# Patient Record
Sex: MALE | Race: BLACK OR AFRICAN AMERICAN | NOT HISPANIC OR LATINO | Employment: FULL TIME | URBAN - METROPOLITAN AREA
[De-identification: names, ages, dates, MRNs, and addresses within clinical notes are randomized per-mention and may not be internally consistent; named-entity substitution may affect disease eponyms.]

---

## 2018-02-01 ENCOUNTER — HOSPITAL ENCOUNTER (EMERGENCY)
Facility: HOSPITAL | Age: 43
Discharge: HOME | End: 2018-02-01
Attending: EMERGENCY MEDICINE | Admitting: EMERGENCY MEDICINE
Payer: COMMERCIAL

## 2018-02-01 VITALS
OXYGEN SATURATION: 96 % | HEART RATE: 84 BPM | TEMPERATURE: 98.1 F | DIASTOLIC BLOOD PRESSURE: 102 MMHG | HEIGHT: 69 IN | SYSTOLIC BLOOD PRESSURE: 174 MMHG | RESPIRATION RATE: 17 BRPM

## 2018-02-01 DIAGNOSIS — K42.9 UMBILICAL HERNIA: Primary | ICD-10-CM

## 2018-02-01 PROCEDURE — 99283 EMERGENCY DEPT VISIT LOW MDM: CPT

## 2018-02-02 NOTE — DISCHARGE INSTRUCTIONS
Follow up with General surgery  Hold that area and push the hernia back in if it comes out  Return increasing pain redness fever vomiting or any problems    Umbilical Hernia   WHAT YOU NEED TO KNOW:   An umbilical hernia is a bulge through the abdominal muscles in the area of the navel (belly button)  The hernia may contain tissue from the abdomen, part of an organ (such as the intestine), or fluid  Umbilical hernias usually happen because of a hole or a weak area in the muscles of the abdominal wall  DISCHARGE INSTRUCTIONS:   Medicines:  · Ibuprofen or acetaminophen:  These medicines decrease pain  They are available without a doctor's order  Ask your healthcare provider which medicine is right for you  Ask how much to take and how often to take it  Follow directions  These medicines can cause stomach bleeding if not taken correctly  Ibuprofen can cause kidney damage  Do not take ibuprofen if you have kidney disease, an ulcer, or allergies to aspirin  Acetaminophen can cause liver damage  Do not drink alcohol if you take acetaminophen  · Take your medicine as directed  Contact your healthcare provider if you think your medicine is not helping or if you have side effects  Tell him of her if you are allergic to any medicine  Keep a list of the medicines, vitamins, and herbs you take  Include the amounts, and when and why you take them  Bring the list or the pill bottles to follow-up visits  Carry your medicine list with you in case of an emergency  Follow up with your healthcare provider as directed:  Write down your questions so you remember to ask them during your visits  Self care:   · Activity:  Avoid lifting, bending, and straining  Try not to stand for long periods of time  If you have to cough, try to cough gently  Support the hernia by holding your hand or a pillow over it when coughing  Ask your healthcare provider if it is okay for you to have sexual intercourse       · Prevent constipation: Straining to have a bowel movement may make your hernia worse  Eat foods that are high in fiber and drink at least 8 glasses of water a day  A high-fiber diet includes whole grains, bran, cereals, and uncooked fruit and vegetables  Walking or other exercise can also help  Your healthcare provider may give you fiber medicine or a stool softener to help make your bowel movements softer and more regular  Do not use an enema or a laxative unless your healthcare provider says it is okay  · What to wear:  Do not wear anything tight over your hernia  Ask your healthcare provider if you should wear support belt or girdle to keep the hernia in place  · Ask your healthcare provider how to reduce your hernia:  Sometimes your hernia will slip back into your abdomen if you lie flat for a while  Ask your healthcare provider if you should try to gently push your hernia back into place if lying flat does not work  If your hernia does not slide back into your abdomen easily, stop pushing on it and call your healthcare provider  Do not try to push the hernia back into place if it is painful or tender  Contact your healthcare provider if:   · You have nausea or vomiting  · You cannot gently push your hernia back into your abdomen  (Do this only if your healthcare provider has shown you how to do it )     · You are constipated or have blood in your bowel movements  · Your hernia is getting bigger  · You have questions or concerns about your condition or care  Return to the emergency department if:   · You have a fever  · Your hernia is stuck outside your abdomen and is painful, swollen, or feels hard  · You completely stop having bowel movements and stop passing gas  · Your abdominal pain is bad or getting worse  © 2017 [employer]  Information is for End User's use only and may not be sold, redistributed or otherwise used for commercial purposes   All illustrations and images included in CareNotes® are the copyrighted property of [employer]  or Nasim Subramanian  The above information is an  only  It is not intended as medical advice for individual conditions or treatments  Talk to your doctor, nurse or pharmacist before following any medical regimen to see if it is safe and effective for you

## 2018-02-02 NOTE — ED PROVIDER NOTES
History  Chief Complaint   Patient presents with    Abdominal Pain     Pt c/o abdominal pain around umbilicus starting 3 weeks ago  Denies n/v/d  States "there's a lump where it hurts"     HPI patient is a 79-year-old male, he reports a intermittent bulge in his abdomen he has noticed over the last 3 weeks  Patient reports the lump gets very painful there specially with lifting at work  He reports that he can't push it back at times but sometimes it sticks out  Patient reports today he felt a significant lump there and some difficulty pushing it back so he came to the hospital   He denies any vomiting  He reports the pain has subsided and he has no pain currently now  Patient reports he only feels lump that when he strains or tries to push it out  He denies any fever or chills  He denies any previous abdominal surgery  Denies any pain in his scrotum or rectum  Past medical history of asthma  Family history noncontributory  Social history nonsmoker, employed    None       Past Medical History:   Diagnosis Date    Asthma        History reviewed  No pertinent surgical history  History reviewed  No pertinent family history  I have reviewed and agree with the history as documented  Social History   Substance Use Topics    Smoking status: Never Smoker    Smokeless tobacco: Never Used    Alcohol use Yes      Comment: occasionally        Review of Systems   Constitutional: Negative for fever  HENT: Negative for congestion  Eyes: Negative for pain and redness  Respiratory: Negative for cough and shortness of breath  Cardiovascular: Negative for chest pain  Gastrointestinal: Positive for abdominal pain  Negative for diarrhea and vomiting         Physical Exam  ED Triage Vitals [02/01/18 2120]   Temperature Pulse Respirations Blood Pressure SpO2   98 1 °F (36 7 °C) 84 17 (!) 174/102 96 %      Temp Source Heart Rate Source Patient Position - Orthostatic VS BP Location FiO2 (%)   Oral Monitor Sitting Left arm --      Pain Score       7           Orthostatic Vital Signs  Vitals:    02/01/18 2120   BP: (!) 174/102   Pulse: 84   Patient Position - Orthostatic VS: Sitting       Physical Exam   Constitutional: He is oriented to person, place, and time  He appears well-developed and well-nourished  HENT:   Head: Normocephalic  Right Ear: External ear normal    Left Ear: External ear normal    Nose: Nose normal    Mouth/Throat: Oropharynx is clear and moist    Eyes: EOM and lids are normal  Pupils are equal, round, and reactive to light  Neck: Normal range of motion  Neck supple  Cardiovascular: Normal rate and regular rhythm  Pulmonary/Chest: Effort normal  No respiratory distress  Abdominal: Soft  Bowel sounds are normal  He exhibits no distension  There is an umbilical hernia palpable on with patient standing and when he bears down, when the patient is flat easily reduces, normal testicles, normal scrotum, no inguinal hernias  Musculoskeletal: Normal range of motion  He exhibits no deformity  Neurological: He is alert and oriented to person, place, and time  Skin: Skin is warm and dry  Psychiatric: He has a normal mood and affect  Nursing note and vitals reviewed  ED Medications  Medications - No data to display    Diagnostic Studies  Results Reviewed     None                 No orders to display              Procedures  Procedures       Phone Contacts  ED Phone Contact    ED Course  ED Course                                MDM medical decision making 78-year-old male presents with history of a lump around his umbilicus, on physical exam has a reducible umbilical hernia, we discussed outpatient follow-up with surgery for repair, we discussed indications to return such as when the hernia gets stuck out, watch for redness swelling and persistent pain and a hernia he cannot reduce  No sign of incarceration at this time    CritCare Time    Disposition  Final diagnoses:   Umbilical hernia     Time reflects when diagnosis was documented in both MDM as applicable and the Disposition within this note     Time User Action Codes Description Comment    2/1/2018 10:16 PM Reta Neri [B43 8] Umbilical hernia       ED Disposition     ED Disposition Condition Comment    Discharge  Alesha Rosenberg discharge to home/self care  Condition at discharge: Good        Follow-up Information     Follow up With [facility], [address] [place] [address]  [phone]          There are no discharge medications for this patient  No discharge procedures on file      ED Provider  Electronically Signed by           Arnaldo Villatoro MD  02/02/18 5014

## 2018-02-06 ENCOUNTER — OFFICE VISIT (OUTPATIENT)
Dept: SURGERY | Facility: CLINIC | Age: 43
End: 2018-02-06
Payer: COMMERCIAL

## 2018-02-06 VITALS
SYSTOLIC BLOOD PRESSURE: 138 MMHG | TEMPERATURE: 97.9 F | BODY MASS INDEX: 30.07 KG/M2 | RESPIRATION RATE: 15 BRPM | HEART RATE: 74 BPM | WEIGHT: 210.04 LBS | HEIGHT: 70 IN | DIASTOLIC BLOOD PRESSURE: 86 MMHG

## 2018-02-06 DIAGNOSIS — K42.0 INCARCERATED UMBILICAL HERNIA: Primary | ICD-10-CM

## 2018-02-06 PROCEDURE — 99243 OFF/OP CNSLTJ NEW/EST LOW 30: CPT | Performed by: SURGERY

## 2018-02-06 RX ORDER — SODIUM CHLORIDE, SODIUM LACTATE, POTASSIUM CHLORIDE, CALCIUM CHLORIDE 600; 310; 30; 20 MG/100ML; MG/100ML; MG/100ML; MG/100ML
125 INJECTION, SOLUTION INTRAVENOUS CONTINUOUS
Status: CANCELLED | OUTPATIENT
Start: 2018-02-06

## 2018-02-06 RX ORDER — ALBUTEROL SULFATE 90 UG/1
2 AEROSOL, METERED RESPIRATORY (INHALATION)
COMMUNITY
Start: 2015-05-15

## 2018-02-06 NOTE — PROGRESS NOTES
Consult- General Surgery   Mike Wes Ulrich 36 y o  male MRN: [id number]  Unit/Bed#:  Encounter: [id number]    Assessment/Plan     Assessment:  Incarcerated umbilical hernia  Plan:  I advised the patient to undergo laparoscopic umbilical hernia repair with mesh  I discussed the operative procedure, risks, benefits and alternatives with the patient, he understood and agreed to proceed  History of Present Illness     HPI:  Shante Mcclellan is a 36 y o  male who presents with umbilical pain last week, he went to the emergency room and evaluated  The patient was referred to us for surgical evaluation of umbilical hernia  The patient had pain after coughing from a cold, noticed a bulge there was significant in size, painful to palpation without nausea, vomiting, diarrhea, constipation or any other constitutional symptoms  Review of Systems   Constitutional: Negative for chills and fever  HENT: Negative for nosebleeds and sore throat  Eyes: Negative for pain and discharge  Respiratory: Negative for cough and shortness of breath  Cardiovascular: Negative for chest pain and palpitations  Gastrointestinal:        As per history of present illness  Endocrine: Negative for cold intolerance and heat intolerance  Genitourinary: Negative for dysuria and hematuria  Musculoskeletal: Negative for arthralgias and joint swelling  Neurological: Negative for seizures and headaches  Hematological: Negative for adenopathy  Does not bruise/bleed easily  Psychiatric/Behavioral: Negative for confusion  The patient is not nervous/anxious  Historical Information   Past Medical History:   Diagnosis Date    Asthma      No past surgical history on file  Social History   History   Alcohol Use    Yes     Comment: occasionally     History   Drug Use No     History   Smoking Status    Never Smoker   Smokeless Tobacco    Never Used     Family History: No family history on file      Meds/Allergies   PTA meds:   Cannot display prior to admission medications because the patient has not been admitted in this contact  No Known Allergies    Objective   First Vitals:   @VSFIRST2(5,8,6,7,9,11,14,10:FIRST)@    Current Vitals:   Blood Pressure: 138/86 (02/06/18 1029)  Pulse: 74 (02/06/18 1029)  Temperature: 97 9 °F (36 6 °C) (02/06/18 1029)  Temp Source: Oral (02/06/18 1029)  Respirations: 15 (02/06/18 1029)  Height: 5' 10" (177 8 cm) (02/06/18 1029)  Weight - Scale: 95 3 kg (210 lb 0 6 oz) (02/06/18 1029)    [unfilled]    Invasive Devices          No matching active lines, drains, or airways          Physical Exam   Constitutional: He is oriented to person, place, and time  He appears well-developed and well-nourished  No distress  Morbidly obese   HENT:   Head: Normocephalic  Mouth/Throat: No oropharyngeal exudate  Eyes: Pupils are equal, round, and reactive to light  No scleral icterus  Neck: Normal range of motion  Neck supple  No thyromegaly present  Cardiovascular: Normal rate and regular rhythm  No murmur heard  Pulmonary/Chest: Effort normal and breath sounds normal  No respiratory distress  He has no wheezes  Abdominal:   Abdomen is obese, soft, nondistended and nontender  There is a small incarcerated umbilical hernia with a bulge protruding to the right of the umbilicus  There is no obvious visceromegaly or mass palpable  Musculoskeletal: Normal range of motion  He exhibits no edema  Lymphadenopathy:     He has no cervical adenopathy  Neurological: He is alert and oriented to person, place, and time  No cranial nerve deficit  Skin: Skin is warm and dry  No rash noted  No erythema  Psychiatric: He has a normal mood and affect   His behavior is normal

## 2018-02-14 ENCOUNTER — TELEPHONE (OUTPATIENT)
Dept: SURGERY | Facility: CLINIC | Age: 43
End: 2018-02-14

## 2018-02-14 NOTE — TELEPHONE ENCOUNTER
called pt asking for him to please contact the office as soon as he can, regarding scheduling his surgery

## 2018-02-19 ENCOUNTER — TELEPHONE (OUTPATIENT)
Dept: SURGERY | Facility: CLINIC | Age: 43
End: 2018-02-19

## 2018-05-29 ENCOUNTER — OFFICE VISIT (OUTPATIENT)
Dept: SURGERY | Facility: CLINIC | Age: 43
End: 2018-05-29
Payer: COMMERCIAL

## 2018-05-29 VITALS
WEIGHT: 264.5 LBS | DIASTOLIC BLOOD PRESSURE: 74 MMHG | HEIGHT: 70 IN | SYSTOLIC BLOOD PRESSURE: 132 MMHG | RESPIRATION RATE: 16 BRPM | TEMPERATURE: 98.3 F | HEART RATE: 72 BPM | BODY MASS INDEX: 37.87 KG/M2

## 2018-05-29 DIAGNOSIS — K42.0 INCARCERATED UMBILICAL HERNIA: Primary | ICD-10-CM

## 2018-05-29 PROCEDURE — 99213 OFFICE O/P EST LOW 20 MIN: CPT | Performed by: SURGERY

## 2018-05-29 RX ORDER — ENOXAPARIN SODIUM 300 MG/3ML
40 INJECTION INTRAVENOUS; SUBCUTANEOUS
Status: CANCELLED | OUTPATIENT
Start: 2018-05-29 | End: 2018-05-30

## 2018-05-29 RX ORDER — SODIUM CHLORIDE, SODIUM LACTATE, POTASSIUM CHLORIDE, CALCIUM CHLORIDE 600; 310; 30; 20 MG/100ML; MG/100ML; MG/100ML; MG/100ML
125 INJECTION, SOLUTION INTRAVENOUS
Status: CANCELLED | OUTPATIENT
Start: 2018-05-29 | End: 2018-05-30

## 2018-05-29 NOTE — PROGRESS NOTES
Follow Up - General Surgery   Mike Parker 39 y o  male MRN: [id number]  Unit/Bed#:  Encounter: [id number]    Assessment/Plan     Assessment:  Incarcerated umbilical hernia, symptomatic  Plan:  I advised the patient to undergo laparoscopic umbilical hernia repair with mesh in the near future  I discussed the operative procedure, risks, benefits and alternatives with the patient, he understood and agreed to proceed  History of Present Illness     HPI:  I had the pleasure of seeing Padilla Neighbours in the office today accompanied by his wife for follow-up  The patient is a pleasant 39 y o  male who presents with discomfort from the umbilicus  The patient was originally seen in my office in February of this year because of painful bulge from the umbilicus  The patient decided to postpone surgery  He is here in the office to schedule surgery  He complains of pain and discomfort and that the bulge has increased in size  Review of Systems: The rest of the review of system total of 10 were negative except for the HPI  Historical Information   Past Medical History:   Diagnosis Date    Asthma      No past surgical history on file    Social History   History   Alcohol Use    Yes     Comment: occasionally     History   Drug Use No     History   Smoking Status    Never Smoker   Smokeless Tobacco    Never Used     Family History: non-contributory    Meds/Allergies   all medications and allergies reviewed     Current Outpatient Prescriptions:     albuterol (PROVENTIL HFA,VENTOLIN HFA) 90 mcg/act inhaler, Inhale 2 puffs, Disp: , Rfl:   No Known Allergies    Objective     Current Vitals:   Blood Pressure: 132/74 (05/29/18 0939)  Pulse: 72 (05/29/18 0939)  Temperature: 98 3 °F (36 8 °C) (05/29/18 0939)  Temp Source: Oral (05/29/18 0939)  Respirations: 16 (05/29/18 0939)  Height: 5' 10" (177 8 cm) (05/29/18 0939)  Weight - Scale: 120 kg (264 lb 8 oz) (05/29/18 0939)      Invasive Devices          No matching active lines, drains, or airways          Physical Exam   Constitutional: He is oriented to person, place, and time  He appears well-developed and well-nourished  No distress  Morbidly obese  HENT:   Head: Normocephalic  Mouth/Throat: No oropharyngeal exudate  Eyes: Pupils are equal, round, and reactive to light  No scleral icterus  Neck: Normal range of motion  Neck supple  Cardiovascular: Normal rate and regular rhythm  No murmur heard  Pulmonary/Chest: Effort normal and breath sounds normal  No respiratory distress  Abdominal:   The abdomen is obese, soft, nondistended and nontender  There is an obvious umbilical hernia, unable to reduce it, somewhat tender to palpation  There is no obvious visceromegaly or mass palpable  Musculoskeletal: He exhibits no edema or tenderness  Lymphadenopathy:     He has no cervical adenopathy  Neurological: He is alert and oriented to person, place, and time  No cranial nerve deficit  Skin: No rash noted  No erythema  Psychiatric: He has a normal mood and affect   His behavior is normal

## 2018-05-30 PROBLEM — K42.0 INCARCERATED UMBILICAL HERNIA: Status: ACTIVE | Noted: 2018-05-30

## 2018-07-18 ENCOUNTER — APPOINTMENT (OUTPATIENT)
Dept: PREADMISSION TESTING | Facility: HOSPITAL | Age: 43
End: 2018-07-18
Payer: COMMERCIAL

## 2018-07-18 ENCOUNTER — APPOINTMENT (OUTPATIENT)
Dept: LAB | Facility: HOSPITAL | Age: 43
End: 2018-07-18
Attending: SURGERY
Payer: COMMERCIAL

## 2018-07-18 DIAGNOSIS — K42.0 INCARCERATED UMBILICAL HERNIA: ICD-10-CM

## 2018-07-18 LAB
ALBUMIN SERPL BCP-MCNC: 3.3 G/DL (ref 3.5–5)
ALP SERPL-CCNC: 93 U/L (ref 46–116)
ALT SERPL W P-5'-P-CCNC: 31 U/L (ref 12–78)
ANION GAP SERPL CALCULATED.3IONS-SCNC: 6 MMOL/L (ref 4–13)
AST SERPL W P-5'-P-CCNC: 20 U/L (ref 5–45)
BILIRUB SERPL-MCNC: 0.2 MG/DL (ref 0.2–1)
BUN SERPL-MCNC: 14 MG/DL (ref 5–25)
CALCIUM SERPL-MCNC: 8.9 MG/DL (ref 8.3–10.1)
CHLORIDE SERPL-SCNC: 106 MMOL/L (ref 100–108)
CO2 SERPL-SCNC: 28 MMOL/L (ref 21–32)
CREAT SERPL-MCNC: 1.01 MG/DL (ref 0.6–1.3)
ERYTHROCYTE [DISTWIDTH] IN BLOOD BY AUTOMATED COUNT: 13.7 % (ref 11.6–15.1)
GFR SERPL CREATININE-BSD FRML MDRD: 104 ML/MIN/1.73SQ M
GLUCOSE P FAST SERPL-MCNC: 100 MG/DL (ref 65–99)
HCT VFR BLD AUTO: 41.9 % (ref 36.5–49.3)
HGB BLD-MCNC: 13.7 G/DL (ref 12–17)
MCH RBC QN AUTO: 27.2 PG (ref 26.8–34.3)
MCHC RBC AUTO-ENTMCNC: 32.7 G/DL (ref 31.4–37.4)
MCV RBC AUTO: 83 FL (ref 82–98)
PLATELET # BLD AUTO: 208 THOUSANDS/UL (ref 149–390)
PMV BLD AUTO: 10.2 FL (ref 8.9–12.7)
POTASSIUM SERPL-SCNC: 3.6 MMOL/L (ref 3.5–5.3)
PROT SERPL-MCNC: 7.3 G/DL (ref 6.4–8.2)
RBC # BLD AUTO: 5.03 MILLION/UL (ref 3.88–5.62)
SODIUM SERPL-SCNC: 140 MMOL/L (ref 136–145)
WBC # BLD AUTO: 7.47 THOUSAND/UL (ref 4.31–10.16)

## 2018-07-18 PROCEDURE — 36415 COLL VENOUS BLD VENIPUNCTURE: CPT

## 2018-07-18 PROCEDURE — 80053 COMPREHEN METABOLIC PANEL: CPT

## 2018-07-18 PROCEDURE — 85027 COMPLETE CBC AUTOMATED: CPT

## 2018-07-20 NOTE — PRE-PROCEDURE INSTRUCTIONS
Pre-Surgery Instructions:   Medication Instructions    albuterol (PROVENTIL HFA,VENTOLIN HFA) 90 mcg/act inhaler Patient was instructed by Physician and understands

## 2018-07-26 ENCOUNTER — ANESTHESIA EVENT (OUTPATIENT)
Dept: PERIOP | Facility: HOSPITAL | Age: 43
End: 2018-07-26
Payer: COMMERCIAL

## 2018-07-26 NOTE — ANESTHESIA PREPROCEDURE EVALUATION
Review of Systems/Medical History  Patient summary reviewed  Chart reviewed  No history of anesthetic complications     Cardiovascular  Negative cardio ROS    Pulmonary  Not a smoker , Asthma , PRN med  controlled Last rescue: < 1 week ago Asthma type of rescue: PRN inhaler, No shortness of breath, No recent URI , No ventilator dependent respiratory failure,   Comment: STOP BANG >3     GI/Hepatic  Negative GI/hepatic ROS          Negative  ROS        Endo/Other  No diabetes , No history of thyroid disease ,   Obesity  morbid obesity   GYN  Negative gynecology ROS          Hematology  Negative hematology ROS      Musculoskeletal  Negative musculoskeletal ROS        Neurology  Negative neurology ROS      Psychology   Negative psychology ROS              Physical Exam    Airway    Mallampati score: I  TM Distance: >3 FB  Neck ROM: full     Dental   No notable dental hx     Cardiovascular  Comment: Negative ROS, Rhythm: regular, Rate: normal,     Pulmonary  Breath sounds clear to auscultation,     Other Findings        Anesthesia Plan  ASA Score- 2     Anesthesia Type- general with ASA Monitors  Additional Monitors:   Airway Plan: ETT  Plan Factors-    Induction- intravenous  Postoperative Plan- Plan for postoperative opioid use  Planned trial extubation    Informed Consent- Anesthetic plan and risks discussed with patient  I personally reviewed this patient with the CRNA  Discussed and agreed on the Anesthesia Plan with the PRESLEY Jackson

## 2018-07-27 ENCOUNTER — HOSPITAL ENCOUNTER (OUTPATIENT)
Facility: HOSPITAL | Age: 43
Setting detail: OUTPATIENT SURGERY
Discharge: HOME | End: 2018-07-27
Attending: SURGERY | Admitting: SURGERY
Payer: COMMERCIAL

## 2018-07-27 ENCOUNTER — ANESTHESIA (OUTPATIENT)
Dept: PERIOP | Facility: HOSPITAL | Age: 43
End: 2018-07-27
Payer: COMMERCIAL

## 2018-07-27 VITALS
HEIGHT: 69 IN | BODY MASS INDEX: 38.96 KG/M2 | OXYGEN SATURATION: 98 % | SYSTOLIC BLOOD PRESSURE: 133 MMHG | TEMPERATURE: 97.6 F | HEART RATE: 66 BPM | RESPIRATION RATE: 11 BRPM | DIASTOLIC BLOOD PRESSURE: 65 MMHG | WEIGHT: 263.01 LBS

## 2018-07-27 DIAGNOSIS — K42.0 INCARCERATED UMBILICAL HERNIA: ICD-10-CM

## 2018-07-27 PROCEDURE — C1781 MESH (IMPLANTABLE): HCPCS | Performed by: SURGERY

## 2018-07-27 PROCEDURE — 49653 PR LAP, VENTRAL HERNIA REPAIR,INCARCERATED: CPT | Performed by: PHYSICIAN ASSISTANT

## 2018-07-27 PROCEDURE — 49653 PR LAP, VENTRAL HERNIA REPAIR,INCARCERATED: CPT | Performed by: SURGERY

## 2018-07-27 DEVICE — VENTRALIGHT ST MESH
Type: IMPLANTABLE DEVICE | Site: UMBILICAL | Status: FUNCTIONAL
Brand: VENTRALIGHT ST

## 2018-07-27 RX ORDER — BUPIVACAINE HYDROCHLORIDE 2.5 MG/ML
INJECTION, SOLUTION EPIDURAL; INFILTRATION; INTRACAUDAL AS NEEDED
Status: DISCONTINUED | OUTPATIENT
Start: 2018-07-27 | End: 2018-07-27 | Stop reason: HOSPADM

## 2018-07-27 RX ORDER — ONDANSETRON 2 MG/ML
4 INJECTION INTRAMUSCULAR; INTRAVENOUS ONCE AS NEEDED
Status: DISCONTINUED | OUTPATIENT
Start: 2018-07-27 | End: 2018-07-27 | Stop reason: HOSPADM

## 2018-07-27 RX ORDER — FENTANYL CITRATE 50 UG/ML
INJECTION, SOLUTION INTRAMUSCULAR; INTRAVENOUS AS NEEDED
Status: DISCONTINUED | OUTPATIENT
Start: 2018-07-27 | End: 2018-07-27 | Stop reason: SURG

## 2018-07-27 RX ORDER — MORPHINE SULFATE 2 MG/ML
2 INJECTION, SOLUTION INTRAMUSCULAR; INTRAVENOUS EVERY 2 HOUR PRN
Status: DISCONTINUED | OUTPATIENT
Start: 2018-07-27 | End: 2018-07-27 | Stop reason: HOSPADM

## 2018-07-27 RX ORDER — GLYCOPYRROLATE 0.2 MG/ML
INJECTION INTRAMUSCULAR; INTRAVENOUS AS NEEDED
Status: DISCONTINUED | OUTPATIENT
Start: 2018-07-27 | End: 2018-07-27 | Stop reason: SURG

## 2018-07-27 RX ORDER — MAGNESIUM HYDROXIDE 1200 MG/15ML
LIQUID ORAL AS NEEDED
Status: DISCONTINUED | OUTPATIENT
Start: 2018-07-27 | End: 2018-07-27 | Stop reason: HOSPADM

## 2018-07-27 RX ORDER — FENTANYL CITRATE/PF 50 MCG/ML
50 SYRINGE (ML) INJECTION
Status: DISCONTINUED | OUTPATIENT
Start: 2018-07-27 | End: 2018-07-27 | Stop reason: HOSPADM

## 2018-07-27 RX ORDER — MIDAZOLAM HYDROCHLORIDE 1 MG/ML
INJECTION INTRAMUSCULAR; INTRAVENOUS AS NEEDED
Status: DISCONTINUED | OUTPATIENT
Start: 2018-07-27 | End: 2018-07-27 | Stop reason: SURG

## 2018-07-27 RX ORDER — ONDANSETRON 2 MG/ML
4 INJECTION INTRAMUSCULAR; INTRAVENOUS EVERY 6 HOURS PRN
Status: DISCONTINUED | OUTPATIENT
Start: 2018-07-27 | End: 2018-07-27 | Stop reason: HOSPADM

## 2018-07-27 RX ORDER — ROCURONIUM BROMIDE 10 MG/ML
INJECTION, SOLUTION INTRAVENOUS AS NEEDED
Status: DISCONTINUED | OUTPATIENT
Start: 2018-07-27 | End: 2018-07-27 | Stop reason: SURG

## 2018-07-27 RX ORDER — PROPOFOL 10 MG/ML
INJECTION, EMULSION INTRAVENOUS AS NEEDED
Status: DISCONTINUED | OUTPATIENT
Start: 2018-07-27 | End: 2018-07-27 | Stop reason: SURG

## 2018-07-27 RX ORDER — SODIUM CHLORIDE, SODIUM LACTATE, POTASSIUM CHLORIDE, CALCIUM CHLORIDE 600; 310; 30; 20 MG/100ML; MG/100ML; MG/100ML; MG/100ML
125 INJECTION, SOLUTION INTRAVENOUS
Status: DISCONTINUED | OUTPATIENT
Start: 2018-07-27 | End: 2018-07-27 | Stop reason: HOSPADM

## 2018-07-27 RX ORDER — SODIUM CHLORIDE, SODIUM LACTATE, POTASSIUM CHLORIDE, CALCIUM CHLORIDE 600; 310; 30; 20 MG/100ML; MG/100ML; MG/100ML; MG/100ML
75 INJECTION, SOLUTION INTRAVENOUS CONTINUOUS
Status: DISCONTINUED | OUTPATIENT
Start: 2018-07-27 | End: 2018-07-27 | Stop reason: HOSPADM

## 2018-07-27 RX ORDER — ONDANSETRON 2 MG/ML
INJECTION INTRAMUSCULAR; INTRAVENOUS AS NEEDED
Status: DISCONTINUED | OUTPATIENT
Start: 2018-07-27 | End: 2018-07-27 | Stop reason: SURG

## 2018-07-27 RX ORDER — ACETAMINOPHEN AND CODEINE PHOSPHATE 300; 30 MG/1; MG/1
1 TABLET ORAL EVERY 6 HOURS PRN
Qty: 30 TABLET | Refills: 0 | Status: SHIPPED | OUTPATIENT
Start: 2018-07-27 | End: 2018-08-06

## 2018-07-27 RX ORDER — KETOROLAC TROMETHAMINE 30 MG/ML
INJECTION, SOLUTION INTRAMUSCULAR; INTRAVENOUS AS NEEDED
Status: DISCONTINUED | OUTPATIENT
Start: 2018-07-27 | End: 2018-07-27 | Stop reason: SURG

## 2018-07-27 RX ORDER — OXYCODONE HYDROCHLORIDE AND ACETAMINOPHEN 5; 325 MG/1; MG/1
1 TABLET ORAL EVERY 4 HOURS PRN
Status: DISCONTINUED | OUTPATIENT
Start: 2018-07-27 | End: 2018-07-27 | Stop reason: HOSPADM

## 2018-07-27 RX ADMIN — KETOROLAC TROMETHAMINE 30 MG: 30 INJECTION, SOLUTION INTRAMUSCULAR at 08:51

## 2018-07-27 RX ADMIN — PROPOFOL 200 MG: 10 INJECTION, EMULSION INTRAVENOUS at 07:52

## 2018-07-27 RX ADMIN — ENOXAPARIN SODIUM 40 MG: 40 INJECTION SUBCUTANEOUS at 07:15

## 2018-07-27 RX ADMIN — MIDAZOLAM HYDROCHLORIDE 2 MG: 1 INJECTION, SOLUTION INTRAMUSCULAR; INTRAVENOUS at 07:49

## 2018-07-27 RX ADMIN — HYDROMORPHONE HYDROCHLORIDE 0.5 MG: 1 INJECTION, SOLUTION INTRAMUSCULAR; INTRAVENOUS; SUBCUTANEOUS at 08:06

## 2018-07-27 RX ADMIN — OXYCODONE HYDROCHLORIDE AND ACETAMINOPHEN 1 TABLET: 5; 325 TABLET ORAL at 11:05

## 2018-07-27 RX ADMIN — ONDANSETRON 4 MG: 2 INJECTION INTRAMUSCULAR; INTRAVENOUS at 08:49

## 2018-07-27 RX ADMIN — FENTANYL CITRATE 100 MCG: 50 INJECTION, SOLUTION INTRAMUSCULAR; INTRAVENOUS at 07:52

## 2018-07-27 RX ADMIN — FENTANYL CITRATE 50 MCG: 50 INJECTION, SOLUTION INTRAMUSCULAR; INTRAVENOUS at 09:34

## 2018-07-27 RX ADMIN — LIDOCAINE HYDROCHLORIDE 100 MG: 20 INJECTION, SOLUTION INTRAVENOUS at 07:52

## 2018-07-27 RX ADMIN — NEOSTIGMINE METHYLSULFATE 4 MG: 1 INJECTION, SOLUTION INTRAMUSCULAR; INTRAVENOUS; SUBCUTANEOUS at 08:58

## 2018-07-27 RX ADMIN — ROCURONIUM BROMIDE 50 MG: 10 INJECTION INTRAVENOUS at 07:52

## 2018-07-27 RX ADMIN — FENTANYL CITRATE 50 MCG: 50 INJECTION, SOLUTION INTRAMUSCULAR; INTRAVENOUS at 09:45

## 2018-07-27 RX ADMIN — GLYCOPYRROLATE 0.6 MG: 0.2 INJECTION, SOLUTION INTRAMUSCULAR; INTRAVENOUS at 08:58

## 2018-07-27 RX ADMIN — SODIUM CHLORIDE, SODIUM LACTATE, POTASSIUM CHLORIDE, AND CALCIUM CHLORIDE 75 ML/HR: .6; .31; .03; .02 INJECTION, SOLUTION INTRAVENOUS at 07:15

## 2018-07-27 RX ADMIN — CEFAZOLIN SODIUM 2000 MG: 2 SOLUTION INTRAVENOUS at 07:57

## 2018-07-27 RX ADMIN — DEXAMETHASONE SODIUM PHOSPHATE 5 MG: 10 INJECTION INTRAMUSCULAR; INTRAVENOUS at 08:06

## 2018-07-27 NOTE — DISCHARGE INSTRUCTIONS
No diet restriction for this surgery  May shower every day  Remove dressings in 3 days  Remove strips in 7 days  Call office to make an appointment in 2 weeks [phone]  Call office with any issues regarding the surgery  No driving, heavy lifting or strenuous exercise for one week  Resume home medications  Apply ice to the incisions  May take Tylenol 3, regular Tylenol or ibuprofen for pain  Alternating narcotics with ibuprofen or Advil leave will have better pain control    May take Colace for constipation

## 2018-07-27 NOTE — ANESTHESIA POSTPROCEDURE EVALUATION
Post-Op Assessment Note      CV Status:  Stable    Mental Status:  Awake    Hydration Status:  Stable    PONV Controlled:  None    Airway Patency:  Patent    Post Op Vitals Reviewed: Yes          Staff: CRNA           BP   160/70   Temp   99 0   Pulse  84   Resp   18   SpO2   99% on 6LFM   Postop VS in PACU noted above, SV non-obstructed

## 2018-07-27 NOTE — OP NOTE
OPERATIVE REPORT  PATIENT NAME: Tere Dandy    :  1975  MRN: [id number]  Pt Location: [facility] OR ROOM 03    SURGERY DATE: 2018    Surgeon(s) and Role:     * Mellissa Jordan MD - Primary     * Cheyenne Waggoner PA-C - Assisting    Preop Diagnosis:  Incarcerated umbilical hernia [W14 2]    Post-Op Diagnosis Codes:     * Incarcerated umbilical hernia [Y60 5]    Procedure(s) (LRB):  LAPAROSCOPIC UMBILICAL HERNIA REPAIR WITH MESH (N/A)    Specimen(s):  * No specimens in log *    Estimated Blood Loss:   Minimal    Drains:       Anesthesia Type:   General    Operative Indications:  Incarcerated umbilical hernia [D36 9]      Operative Findings: There was a 2 5 cm defect at the umbilicus with incarceration of omentum  The rest of the abdominal cavity showed no evidence of inflammatory neoplastic process  Complications:   None    Procedure and Technique:  The patient was identified and then he was taken to the operating room and placed in the operating table in a supine position  After adequate anesthesia induction and satisfactory endotracheal intubation the abdomen was prepped and draped under sterile usual fashion with Chloraprep  Timeout was called the patient was identified as well as the surgical site  The abdominal wall was elevated with towel clips, the Veress needle was introduced through the umbilicus after the omentum was reduced  The abdomen was insufflated with C02  After obtaining adequate pneumoperitoneum 5 mm trocar was placed on the right upper quadrant and the scope was advanced  Exploration of the abdomen was performed with the above findings  11 mm trocar was placed on the right lower quadrant under direct vision  5 mm trocar was placed on the right side of abdomen under direct vision  An additional 5 mm trocar was placed on the left side of the abdomen under direct vision  At this point I proceeded to dissect the hernia sac with the dissected and cautery   11 cm circular ventral light mesh was placed and tacked to the anterior abdominal wall with 0 Nurolon using transfascial U stitch fashion at the 6 and 12:00 position  The rest of the mesh edge was tacked up to the anterior abdominal wall with SorbaFix  The ports were removed under direct vision without evidence of bleeding from the abdominal wall  The subcutaneous tissue was infiltrated with 0 25% of Marcaine and the skin was closed with 4-0 Vicryl in a continuous subcuticular fashion  Sterile dressings were applied  At the end of the case instrument, needles and sponges counts were correct  The patient tolerated the procedure well and then he was transferred to recovery room in a stable conditions     I was present for the entire procedure, A qualified resident physician was not available and A physician assistant was required during the procedure for retraction tissue handling,dissection and suturing    Patient Disposition:  PACU     SIGNATURE: Ivelisse Hayes MD  DATE: July 27, 2018  TIME: 8:51 AM

## 2018-07-27 NOTE — H&P
H&P Exam - General Surgery   Mike Howard 39 y o  male MRN: [id number]  Unit/Bed#: ABBY NOONAN Encounter: [id number]    Assessment/Plan     Assessment:  Incarcerated umbilical hernia  Plan:  The patient was advised to undergo laparoscopic umbilical repair with mesh  History of Present Illness     HPI:  Hawa Perea is a 39 y o  male who presented my office on May 29 because of incarcerated umbilical hernia  Complains of a painful bulge  Review of Systems: The rest of the review of system total of 10 were negative except for the HPI  Historical Information   Past Medical History:   Diagnosis Date    Asthma      Past Surgical History:   Procedure Laterality Date    FINGER SURGERY Right      Social History   History   Alcohol Use    Yes     Comment: occasionally     History   Drug Use No     History   Smoking Status    Never Smoker   Smokeless Tobacco    Never Used     Family History: non-contributory    Meds/Allergies   all medications and allergies reviewed  No Known Allergies    Objective   First Vitals:   Blood Pressure: 154/68 (07/27/18 0708)  Pulse: 81 (07/27/18 0708)  Temperature: 97 9 °F (36 6 °C) (07/27/18 0708)  Temp Source: Temporal (07/27/18 0708)  Respirations: 17 (07/27/18 0708)  Height: 5' 9" (175 3 cm) (07/27/18 0708)  Weight - Scale: 119 kg (263 lb 0 1 oz) (07/27/18 0708)  SpO2: 98 % (07/27/18 0708)    Current Vitals:   Blood Pressure: 154/68 (07/27/18 0708)  Pulse: 81 (07/27/18 0708)  Temperature: 97 9 °F (36 6 °C) (07/27/18 0708)  Temp Source: Temporal (07/27/18 0708)  Respirations: 17 (07/27/18 0708)  Height: 5' 9" (175 3 cm) (07/27/18 0708)  Weight - Scale: 119 kg (263 lb 0 1 oz) (07/27/18 0708)  SpO2: 98 % (07/27/18 0708)    No intake or output data in the 24 hours ending 07/27/18 0733    Invasive Devices     Peripheral Intravenous Line            Peripheral IV 07/27/18 Right Forearm less than 1 day                Physical Exam   Constitutional: He is oriented to person, place, and time  He appears well-developed and well-nourished  No distress  HENT:   Head: Normocephalic  Mouth/Throat: No oropharyngeal exudate  Eyes: Pupils are equal, round, and reactive to light  No scleral icterus  Neck: Normal range of motion  Cardiovascular: Normal rate and regular rhythm  No murmur heard  Pulmonary/Chest: Effort normal and breath sounds normal  No respiratory distress  Abdominal:   Abdomen is soft, obese, nondistended, nontender  There is an incarcerated umbilical hernia, without evidence of strangulation  There is no evidence of visceromegaly or mass palpable  Musculoskeletal: He exhibits no edema or tenderness  Lymphadenopathy:     He has no cervical adenopathy  Neurological: He is alert and oriented to person, place, and time  No cranial nerve deficit  Skin: No rash noted  No erythema  Psychiatric: He has a normal mood and affect   His behavior is normal

## 2018-07-27 NOTE — PROGRESS NOTES
Patient tolerating ginger ale and damian crackers well  No nausea noted    Patient given Percocet as prescribed to help with pain when moving when I get him up at 1130 to ambulate to bathroom for trial

## 2018-07-30 ENCOUNTER — HOSPITAL ENCOUNTER (EMERGENCY)
Facility: HOSPITAL | Age: 43
Discharge: HOME | End: 2018-07-30
Attending: EMERGENCY MEDICINE | Admitting: EMERGENCY MEDICINE
Payer: COMMERCIAL

## 2018-07-30 VITALS
RESPIRATION RATE: 17 BRPM | HEIGHT: 69 IN | BODY MASS INDEX: 38.86 KG/M2 | SYSTOLIC BLOOD PRESSURE: 160 MMHG | HEART RATE: 76 BPM | DIASTOLIC BLOOD PRESSURE: 96 MMHG | TEMPERATURE: 97.8 F | WEIGHT: 262.35 LBS | OXYGEN SATURATION: 96 %

## 2018-07-30 DIAGNOSIS — H11.33: Primary | ICD-10-CM

## 2018-07-30 PROCEDURE — 99283 EMERGENCY DEPT VISIT LOW MDM: CPT

## 2018-07-30 NOTE — ED PROVIDER NOTES
History  Chief Complaint   Patient presents with    Eye Swelling     Patient c/o left and right eye redness and swelling  Patient states"he had hernia surgery on Friday at this campus"  Patient notified his physician and was instructed to come in for an evaluation"  HPI    Prior to Admission Medications   Prescriptions Last Dose Informant Patient Reported? Taking?   acetaminophen-codeine (TYLENOL #3) 300-30 mg per tablet   No No   Sig: Take 1 tablet by mouth every 6 (six) hours as needed for moderate pain for up to 10 days   albuterol (5 mg/mL) 0 5 % nebulizer solution   Yes No   Sig: Take 2 5 mg by nebulization every 6 (six) hours as needed for wheezing   albuterol (PROVENTIL HFA,VENTOLIN HFA) 90 mcg/act inhaler   Yes No   Sig: Inhale 2 puffs      Facility-Administered Medications: None       Past Medical History:   Diagnosis Date    Asthma        Past Surgical History:   Procedure Laterality Date    FINGER SURGERY Right     [place] LAP, VENTRAL HERNIA REPAIR,REDUCIBLE N/A 7/27/2018    Procedure: LAPAROSCOPIC UMBILICAL HERNIA REPAIR WITH MESH;  Surgeon: Krystin Zapien MD;  Location: [facility];  Service: General       Family History   Problem Relation Age of Onset    Hypertension Mother      I have reviewed and agree with the history as documented  Social History   Substance Use Topics    Smoking status: Never Smoker    Smokeless tobacco: Never Used    Alcohol use Yes      Comment: occasionally        Review of Systems    Physical Exam  Physical Exam   Constitutional: He is oriented to person, place, and time  He appears well-developed and well-nourished  No distress  Obese   HENT:   Head: Normocephalic and atraumatic  Eyes: EOM and lids are normal  Pupils are equal, round, and reactive to light  Right eye exhibits no chemosis, no discharge and no exudate  No foreign body present in the right eye  Left eye exhibits no chemosis, no discharge and no exudate  No foreign body present in the left eye  Right conjunctiva has a hemorrhage  Left conjunctiva has a hemorrhage  Slit lamp exam:       The right eye shows no corneal abrasion, no corneal ulcer, no foreign body, no hyphema and no hypopyon  The left eye shows no corneal abrasion, no corneal ulcer, no foreign body, no hyphema and no hypopyon  Neck: Normal range of motion  No tracheal deviation present  Cardiovascular: Normal rate, regular rhythm and intact distal pulses  Pulmonary/Chest: Effort normal  No respiratory distress  Neurological: He is alert and oriented to person, place, and time  GCS eye subscore is 4  GCS verbal subscore is 5  GCS motor subscore is 6  Skin: Skin is warm and dry  Psychiatric: He has a normal mood and affect  His behavior is normal    Nursing note and vitals reviewed  Vital Signs  ED Triage Vitals [07/30/18 1218]   Temperature Pulse Respirations Blood Pressure SpO2   97 8 °F (36 6 °C) 84 20 128/85 98 %      Temp Source Heart Rate Source Patient Position - Orthostatic VS BP Location FiO2 (%)   Oral Monitor Sitting Left arm --      Pain Score       --           Vitals:    07/30/18 1218   BP: 128/85   Pulse: 84   Patient Position - Orthostatic VS: Sitting       Visual Acuity  Visual Acuity      Most Recent Value   Visual acuity R eye is  20/25   Visual acuity Left eye is  20/30   Visual acuity in both eyes is  20/25          ED Medications  Medications - No data to display    Diagnostic Studies  Results Reviewed     None                 No orders to display              Procedures  Procedures       Phone Contacts  ED Phone Contact    ED Course                               MDM  Number of Diagnoses or Management Options  Subconjunctival hemorrhage, non-traumatic, bilateral: new and does not require workup  Diagnosis management comments: This is a 48-year-old male who presents here today with bilateral eye redness  On 7/27 he had hernia surgery    In the recovery room his significant other noticed that the lateral portion of his left eye was red  The CRNA told him that it was likely a small rupture of a blood vessel from coughing in the recovery room  His significant other states it seems to be more obvious the following day, but has not worsened since then  On the morning of 7/28 she noticed that the right eye was turning red, which did worsen yesterday  He called his surgeon and was advised that he needed to be evaluated  He states sometimes it is "irritated" but denies any pain  He denies any vision changes  He denies any trauma to his eye  He is coughing occasionally, but denies any paroxysms of cough  He has no sneezing, straining to move his bowels, or other complaints  He has no prior similar symptoms  He does not wear glasses or contacts at baseline  ROS: Otherwise negative, unless stated as above  He is well-appearing, in no acute distress  He does have subconjunctival hemorrhage bilaterally  There are no signs of injuries periorbitally, corneal abrasions or ulcerations, hyphema or hypopyon or other acute abnormalities  His vision is unremarkable  This could be due to mild pressure to his eyes during the surgery verses increased pressure from coughing either intra op or postop  I discussed with patient and significant other treatment at home, follow-up with Ophthalmology as needed, indications for return, and they expressed understanding with this plan  CritCare Time    Disposition  Final diagnoses:   Subconjunctival hemorrhage, non-traumatic, bilateral     Time reflects when diagnosis was documented in both MDM as applicable and the Disposition within this note     Time User Action Codes Description Comment    7/30/2018  1:27 PM Katie Latif Add [H11 33] Subconjunctival hemorrhage, non-traumatic, bilateral       ED Disposition     ED Disposition Condition Comment    Discharge  Yodit Casillas discharge to home/self care      Condition at discharge: Good        Follow-up Information     Follow up With Specialties Details Why Contact Info    your eye doctor  Schedule an appointment as soon as possible for a visit in 1 week as needed, to follow up on your eyes     [address] [place]  Schedule an appointment as soon as possible for a visit in 1 week As needed to follow up on your eyes [address]  [phone]          Patient's Medications   Discharge Prescriptions    No medications on file     No discharge procedures on file      ED Provider  Electronically Signed by           Pedro Luis Foster MD  07/31/18 3039

## 2018-07-30 NOTE — DISCHARGE INSTRUCTIONS
Use a saline or salt water eyedrops to help with the irritation  Use ice or cool compresses over your eyes as needed for them feeling like they are swollen  Try to avoid coughing, sneezing, straining to move your bowels  Follow up with an eye doctor in a week if you are still having significant redness for further evaluation  Subconjunctival Hemorrhage   WHAT YOU NEED TO KNOW:   A subconjunctival hemorrhage is when blood collects under the conjunctiva in your eye  The conjunctiva is the clear lining that covers the white part of your eye  The blood comes from broken blood vessels under the conjunctiva  DISCHARGE INSTRUCTIONS:   Care for your eye:   · Cold or warm compress:  Use a cold pack during the first 24 hours  Ask how often to apply it and for how long each time  After the first 24 hours, apply a warm pack on your eye  Do this 3 times each day for about 10 to 15 minutes each time  · Eyedrops: You may need artificial tears to keep your eye moist  Use the drops as directed  Follow up with your healthcare provider or eye specialist as directed:  Write down your questions so you remember to ask them during your visits  Contact your healthcare provider or eye specialist if:   · The redness in your eye has not gone away after 3 weeks  · You have another subconjunctival hemorrhage  · You have subconjunctival hemorrhages in both eyes  · You have questions or concerns about your condition or care  Return to the emergency department if:   · You have eye pain or sensitivity to light  · Your vision changes  · You have white or yellow discharge from your eye  © 2017 [address] Information is for End User's use only and may not be sold, redistributed or otherwise used for commercial purposes  All illustrations and images included in CareNotes® are the copyrighted property of [employer]  or Nasim Subramanian  The above information is an  only   It is not intended as medical advice for individual conditions or treatments  Talk to your doctor, nurse or pharmacist before following any medical regimen to see if it is safe and effective for you

## 2018-07-31 ENCOUNTER — TELEPHONE (OUTPATIENT)
Dept: SURGERY | Facility: CLINIC | Age: 43
End: 2018-07-31

## 2018-08-14 ENCOUNTER — OFFICE VISIT (OUTPATIENT)
Dept: SURGERY | Facility: CLINIC | Age: 43
End: 2018-08-14

## 2018-08-14 VITALS
SYSTOLIC BLOOD PRESSURE: 140 MMHG | TEMPERATURE: 98.2 F | BODY MASS INDEX: 39.55 KG/M2 | DIASTOLIC BLOOD PRESSURE: 96 MMHG | WEIGHT: 267 LBS | HEIGHT: 69 IN

## 2018-08-14 DIAGNOSIS — Z48.89 POSTOPERATIVE VISIT: Primary | ICD-10-CM

## 2018-08-14 PROCEDURE — 99024 POSTOP FOLLOW-UP VISIT: CPT | Performed by: SURGERY

## 2018-08-14 NOTE — PROGRESS NOTES
Post-Op Follow Up- General Surgery   Mike Vazquez 39 y o  male MRN: [id number]  Unit/Bed#:  Encounter: [id number]    Assessment/Plan     Assessment:  The patient is status post laparoscopic umbilical hernia repair with mesh, improving  Plan:  The patient will return to my office in 1 month for follow-up  He is allowed to go back to work on August 23rd without restrictions  History of Present Illness     HPI:  Latanya Salazar is a 39 y o  male who presents to my office accompanied by his wife for 1st postop follow-up after laparoscopic umbilical hernia repair with mesh  He complains of mild discomfort from the umbilicus  Denies having any chills, fever or any other constitutional symptoms  He did developed subconjunctival hemorrhage, which subsided with eyedrops prescribed by the emergency room physician  He has no residual hemorrhage at this time        Historical Information   Past Medical History:   Diagnosis Date    Asthma      Past Surgical History:   Procedure Laterality Date    FINGER SURGERY Right     [place] LAP, VENTRAL HERNIA REPAIR,REDUCIBLE N/A 7/27/2018    Procedure: LAPAROSCOPIC UMBILICAL HERNIA REPAIR WITH MESH;  Surgeon: Rubén Stoddard MD;  Location: [facility];  Service: General     Social History   History   Alcohol Use    Yes     Comment: occasionally     History   Drug Use No     History   Smoking Status    Never Smoker   Smokeless Tobacco    Never Used     Family History: non-contributory    Meds/Allergies   all medications and allergies reviewed     Current Outpatient Prescriptions:     albuterol (5 mg/mL) 0 5 % nebulizer solution, Take 2 5 mg by nebulization every 6 (six) hours as needed for wheezing, Disp: , Rfl:     albuterol (PROVENTIL HFA,VENTOLIN HFA) 90 mcg/act inhaler, Inhale 2 puffs, Disp: , Rfl:   No Known Allergies    Objective     Current Vitals:   Blood Pressure: 140/96 (08/14/18 1115)  Temperature: 98 2 °F (36 8 °C) (08/14/18 1115)  Temp Source: Oral (08/14/18 1115)  Height: 5' 9" (175 3 cm) (08/14/18 1115)  Weight - Scale: 121 kg (267 lb) (08/14/18 1115)      Invasive Devices          No matching active lines, drains, or airways          Physical Exam:  The abdomen is soft, nondistended and nontender  Incisions are well-healed without evidence of incisional hernia     There is a small lump above the umbilicus from small seroma  There is no evidence of recurrence of the hernia

## 2018-08-15 ENCOUNTER — TELEPHONE (OUTPATIENT)
Dept: SURGERY | Facility: CLINIC | Age: 43
End: 2018-08-15

## 2018-08-16 ENCOUNTER — TELEPHONE (OUTPATIENT)
Dept: SURGERY | Facility: CLINIC | Age: 43
End: 2018-08-16

## 2018-08-16 NOTE — TELEPHONE ENCOUNTER
Spoke with pt and received number and called insurance spoke with vianey and faxed over needed documents

## 2018-08-16 NOTE — TELEPHONE ENCOUNTER
Called pt in regards to getting a number to call his   The number provided [phone] is a non-assigned [employer] number

## 2018-08-22 ENCOUNTER — TELEPHONE (OUTPATIENT)
Dept: SURGERY | Facility: CLINIC | Age: 43
End: 2018-08-22

## 2018-08-22 NOTE — TELEPHONE ENCOUNTER
pt supposed to have post op next month  pt would like to be seen next week instead to he can be cleared to return to work "financially unable to prolong his retunr to work"- pt aware no available  appt and that he will be called tomorrow with a cleared appointment time

## 2018-09-04 ENCOUNTER — OFFICE VISIT (OUTPATIENT)
Dept: SURGERY | Facility: CLINIC | Age: 43
End: 2018-09-04

## 2018-09-04 VITALS
RESPIRATION RATE: 14 BRPM | HEIGHT: 69 IN | TEMPERATURE: 98.8 F | BODY MASS INDEX: 41.06 KG/M2 | SYSTOLIC BLOOD PRESSURE: 156 MMHG | WEIGHT: 277.2 LBS | DIASTOLIC BLOOD PRESSURE: 102 MMHG | HEART RATE: 85 BPM

## 2018-09-04 DIAGNOSIS — Z48.89 POSTOPERATIVE VISIT: Primary | ICD-10-CM

## 2018-09-04 PROCEDURE — 99024 POSTOP FOLLOW-UP VISIT: CPT | Performed by: SURGERY

## 2018-09-04 NOTE — PROGRESS NOTES
Post-Op Follow Up- General Surgery   Mike Valderrama Dense 39 y o  male MRN: [id number]  Unit/Bed#:  Encounter: [id number]    Assessment/Plan     Assessment:  The patient is status post laparoscopic umbilical hernia repair with mesh, improved  Plan:  The patient is allowed to go back to work without restrictions  History of Present Illness     HPI:  Yasmine Fields is a 39 y o  male who presents to my office for accompanied by his wife for 2nd postop follow-up after laparoscopic umbilical hernia repair with mesh  He offers no complaints at this time  Historical Information   Past Medical History:   Diagnosis Date    Asthma      Past Surgical History:   Procedure Laterality Date    FINGER SURGERY Right     [place] LAP, VENTRAL HERNIA REPAIR,REDUCIBLE N/A 7/27/2018    Procedure: LAPAROSCOPIC UMBILICAL HERNIA REPAIR WITH MESH;  Surgeon: Alisha Cruz MD;  Location: [facility];  Service: General     Social History   History   Alcohol Use    Yes     Comment: occasionally     History   Drug Use No     History   Smoking Status    Never Smoker   Smokeless Tobacco    Never Used     Family History: non-contributory    Meds/Allergies   all medications and allergies reviewed     Current Outpatient Prescriptions:     albuterol (5 mg/mL) 0 5 % nebulizer solution, Take 2 5 mg by nebulization every 6 (six) hours as needed for wheezing, Disp: , Rfl:     albuterol (PROVENTIL HFA,VENTOLIN HFA) 90 mcg/act inhaler, Inhale 2 puffs, Disp: , Rfl:   No Known Allergies    Objective     Current Vitals:   Blood Pressure: (!) 156/102 (09/04/18 1353)  Pulse: 85 (09/04/18 1353)  Temperature: 98 8 °F (37 1 °C) (09/04/18 1353)  Temp Source: Oral (09/04/18 1353)  Respirations: 14 (09/04/18 1353)  Height: 5' 9" (175 3 cm) (09/04/18 1353)  Weight - Scale: 126 kg (277 lb 3 2 oz) (09/04/18 1353)      Invasive Devices          No matching active lines, drains, or airways          Physical Exam:  The abdomen is soft, nondistended and nontender    Incisions are well-healed without evidence of infection or incisional hernia  There is no recurrence of the umbilical hernia

## 2022-12-02 ENCOUNTER — TELEPHONE (OUTPATIENT)
Dept: OBGYN CLINIC | Facility: HOSPITAL | Age: 47
End: 2022-12-02

## 2022-12-02 NOTE — TELEPHONE ENCOUNTER
Ngozi Orozco ([facility])  Doctor: Hunter Britton    Reason for call: requesting sooner appt with any of the hand doctors pt schedule 12/20    Call back#: [phone]

## 2023-01-24 ENCOUNTER — TELEPHONE (OUTPATIENT)
Dept: OBGYN CLINIC | Facility: HOSPITAL | Age: 48
End: 2023-01-24

## 2023-01-24 ENCOUNTER — APPOINTMENT (OUTPATIENT)
Dept: RADIOLOGY | Facility: MEDICAL CENTER | Age: 48
End: 2023-01-24

## 2023-01-24 ENCOUNTER — OFFICE VISIT (OUTPATIENT)
Dept: OBGYN CLINIC | Facility: MEDICAL CENTER | Age: 48
End: 2023-01-24

## 2023-01-24 VITALS
DIASTOLIC BLOOD PRESSURE: 104 MMHG | HEART RATE: 102 BPM | WEIGHT: 297.3 LBS | SYSTOLIC BLOOD PRESSURE: 171 MMHG | BODY MASS INDEX: 44.03 KG/M2 | HEIGHT: 69 IN

## 2023-01-24 DIAGNOSIS — S42.402A LEFT ELBOW FRACTURE, CLOSED, INITIAL ENCOUNTER: Primary | ICD-10-CM

## 2023-01-24 RX ORDER — LEVOCETIRIZINE DIHYDROCHLORIDE 5 MG/1
5 TABLET, FILM COATED ORAL
COMMUNITY
Start: 2022-03-11 | End: 2023-03-11

## 2023-01-24 RX ORDER — FEXOFENADINE HCL 180 MG/1
180 TABLET ORAL DAILY
COMMUNITY
Start: 2023-01-18

## 2023-01-24 RX ORDER — LEVOCETIRIZINE DIHYDROCHLORIDE 5 MG/1
5 TABLET, FILM COATED ORAL EVERY EVENING
COMMUNITY

## 2023-01-24 RX ORDER — FLUTICASONE FUROATE, UMECLIDINIUM BROMIDE AND VILANTEROL TRIFENATATE 200; 62.5; 25 UG/1; UG/1; UG/1
1 POWDER RESPIRATORY (INHALATION) DAILY
COMMUNITY
Start: 2023-01-17

## 2023-01-24 RX ORDER — AZELASTINE 1 MG/ML
SPRAY, METERED NASAL
COMMUNITY

## 2023-01-24 RX ORDER — IBUPROFEN 800 MG/1
800 TABLET ORAL 2 TIMES DAILY
COMMUNITY
Start: 2023-01-17

## 2023-01-24 RX ORDER — MONTELUKAST SODIUM 10 MG/1
10 TABLET ORAL EVERY EVENING
COMMUNITY
Start: 2023-01-11

## 2023-01-24 NOTE — PROGRESS NOTES
[facility] - REINA L KRAKAU [facility] LAURENCE Hutchins [address] [place] [id number]       Mari Alphonso  [id number]  1975    ORTHOPAEDIC SURGERY OUTPATIENT NOTE  1/24/2023      HISTORY:  49 y o  male with left elbow fracture  Patient originally injured his left elbow while at work, [employer]  DOI 9/27/2022  He was seen at [facility] ED, x-rays were taken demonstrating closed fracture of coronoid process of proximal ulna and fracture of medial epicondyle  Patient has been treated by Dr Terrazas at [facility], he was referred to Occupational Therapy, which he attended for 2 months 2x a week  The charts were reviewed by myself  At which point he had continued pain and swelling, and therapy was put on hold  He complains of mechanical symptoms at times  Today the patient states his pain has not improved since the time of injury  He indicates his medial elbow as the primary location of his pain and states it radiates to his forearm and hand  He describes it as stabbing, and significant enough to wake him up at night  He also notes paresthesias at times in his distal upper extremity         Past Medical History:   Diagnosis Date   • Asthma        Past Surgical History:   Procedure Laterality Date   • FINGER SURGERY Right    • [place] LAPS REPAIR HERNIA EXCEPT INCAL/INGUN REDUCIBLE N/A 7/27/2018    Procedure: LAPAROSCOPIC UMBILICAL HERNIA REPAIR WITH MESH;  Surgeon: Umair Ag MD;  Location: [facility];  Service: General       Social History     Socioeconomic History   • Marital status: Single     Spouse name: Not on file   • Number of children: Not on file   • Years of education: Not on file   • Highest education level: Not on file   Occupational History   • Not on file   Tobacco Use   • Smoking status: Never   • Smokeless tobacco: Never   Substance and Sexual Activity   • Alcohol use: Yes     Comment: occasionally   • Drug use: No   • Sexual activity: Yes     Partners: Female     Birth control/protection: None   Other Topics Concern   • Not on file   Social History Narrative   • Not on file     Social Determinants of Health     Financial Resource Strain: Not on file   Food Insecurity: Not on file   Transportation Needs: Not on file   Physical Activity: Not on file   Stress: Not on file   Social Connections: Not on file   Intimate Partner Violence: Not on file   Housing Stability: Not on file       Family History   Problem Relation Age of Onset   • Hypertension Mother         Patient's Medications   New Prescriptions    No medications on file   Previous Medications    ALBUTEROL (5 MG/ML) 0 5 % NEBULIZER SOLUTION    Take 2 5 mg by nebulization every 6 (six) hours as needed for wheezing    ALBUTEROL (PROVENTIL HFA,VENTOLIN HFA) 90 MCG/ACT INHALER    Inhale 2 puffs    AZELASTINE (ASTELIN) 0 1 % NASAL SPRAY        FEXOFENADINE (ALLEGRA) 180 MG TABLET    Take 180 mg by mouth daily    IBUPROFEN (MOTRIN) 800 MG TABLET    Take 800 mg by mouth 2 (two) times a day    LEVOCETIRIZINE (XYZAL) 5 MG TABLET    Take 5 mg by mouth every evening    LEVOCETIRIZINE (XYZAL) 5 MG TABLET    Take 5 mg by mouth    MONTELUKAST (SINGULAIR) 10 MG TABLET    Take 10 mg by mouth every evening    TRELEGY ELLIPTA 200-62 5-25 MCG/ACT AEPB INHALER    Inhale 1 puff daily   Modified Medications    No medications on file   Discontinued Medications    No medications on file       No Known Allergies     BP (!) 171/104 (BP Location: Right arm, Patient Position: Sitting, Cuff Size: Standard)   Pulse 102   Ht 5' 9" (1 753 m)   Wt 135 kg (297 lb 4 8 oz)   BMI 43 90 kg/m²      REVIEW OF SYSTEMS:  Constitutional: Negative  HEENT: Negative  Respiratory: Negative  Skin: Negative  Neurological: Negative  Psychiatric/Behavioral: Negative  Musculoskeletal: Negative except for that mentioned in the HPI      PHYSICAL EXAM:      R elbow:  Flexion: 140 degrees  Extension: 0 degrees  Pronation: 80 degrees  Supination: 80 degrees    TTP Lateral Epicondyle: negative  TTP Medial Epicondyle: negative  TTP Olecranon: negative  TTP Radial Head: negative  TTP Biceps Tendon: negative    Strength:  Flexion: 5/5  Extension: 5/5  Pronation: 5/5  Supination: 5/5    Pain with resisted wrist extension: negative  Pain with resisted 3rd finger extension: negative  Pain with resisted wrist flexion: negative    Varus laxity: negative  Valgus laxity: negative  Milking maneuver: negative  Moving valgus stress test: negative    Cubital tunnel Tinel's: negative    Radial/median/ulnar nerve intact    <2 sec cap refill    L elbow:  Crepitus with range of motion  Flexion: 140 degrees  Extension: 0 degrees  Pronation: 80 degrees  Supination: 80 degrees    TTP Lateral Epicondyle: negative  TTP Medial Epicondyle: negative  TTP Olecranon: negative  TTP Radial Head: negative  TTP Biceps Tendon: negative    Strength:  Flexion: 5/5  Extension: 5/5  Pronation: 5/5  Supination: 5/5    Pain with resisted wrist extension: Positive  Pain with resisted 3rd finger extension: negative  Pain with resisted wrist flexion: negative    Varus laxity: negative  Valgus laxity: negative  Milking maneuver: negative  Moving valgus stress test: negative    Cubital tunnel Tinel's: negative    Radial/median/ulnar nerve intact    <2 sec cap refill    Neck:   Spurling's Maneuver: negative  FROM flexion, extension, rotation, sidebending    Reflexes:   Triceps: symmetric bilaterally  Biceps: symmetric bilaterally  Brachioradialis: symmetric bilaterally    IMAGING:  X-rays of the left elbow taken today and reviewed by myself demonstrate possible lose body anterior compartment, degenerative changes in the medial epicondyle, 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    Previous CT could not be uploaded    ASSESSMENT AND PLAN:  49 y o  male with history of elbow fracture medial epicondyle, DOI 9/27/2022  Patient has had chronic pain and swelling since time of injury   Patient tez CT scan of elbow with him, however it could not be uploaded  X-rays taken and reviewed today  Follow up imaging needs to be ordered for further diagnostics  A MRI, CT, and ultra sound were ordered today, follow up to review imaging       Scribe Attestation    I,:  Torres Burton am acting as a scribe while in the presence of the attending physician :       I,:  Lang Saba personally performed the services described in this documentation    as scribed in my presence :

## 2023-01-24 NOTE — TELEPHONE ENCOUNTER
Caller: [facility]-    Doctor: Kim Claudio    Reason for call: Patients MSK US is being pushed put to march 3 due to the order being routine   Central scheduled advised patient to call the office to have the order changed to state or asap to get it moved up into february     Call back#: [phone]

## 2023-01-25 ENCOUNTER — HOSPITAL ENCOUNTER (OUTPATIENT)
Dept: RADIOLOGY | Facility: HOSPITAL | Age: 48
Discharge: HOME | End: 2023-01-25

## 2023-01-25 DIAGNOSIS — S42.402A LEFT ELBOW FRACTURE, CLOSED, INITIAL ENCOUNTER: Primary | ICD-10-CM

## 2023-01-25 DIAGNOSIS — S42.402A LEFT ELBOW FRACTURE, CLOSED, INITIAL ENCOUNTER: ICD-10-CM

## 2023-01-25 NOTE — TELEPHONE ENCOUNTER
Called and spoke w/pt and informed that GABRIELA BOYKIN is now ordered STAT so that he can get it done before March 3  He says he does not drive and his fiance with have to take him and she does not get home until 4:30  Offered to transfer call to [employer], but pt declined  Contact info for [employer] given and pt will call to schedule

## 2023-01-25 NOTE — TELEPHONE ENCOUNTER
Caller: Tien Urena    Doctor: Dr Yosvany Back    Reason for call: STAT US - the patient called and he is going to call central scheduling to schedule this now  Difficult due to needing a ride   I ask him to please call back if he had any problems    Call back#:[phone]      Called the patient [facility] US set up for to 1/25 in [place] at 2:45 pm

## 2023-02-09 ENCOUNTER — HOSPITAL ENCOUNTER (OUTPATIENT)
Dept: MRI IMAGING | Facility: HOSPITAL | Age: 48
Discharge: HOME | End: 2023-02-09

## 2023-02-09 ENCOUNTER — HOSPITAL ENCOUNTER (OUTPATIENT)
Dept: MRI IMAGING | Facility: CLINIC | Age: 48
Discharge: HOME | End: 2023-02-09

## 2023-02-09 DIAGNOSIS — S42.402A LEFT ELBOW FRACTURE, CLOSED, INITIAL ENCOUNTER: ICD-10-CM

## 2023-02-14 ENCOUNTER — OFFICE VISIT (OUTPATIENT)
Dept: OBGYN CLINIC | Facility: MEDICAL CENTER | Age: 48
End: 2023-02-14

## 2023-02-14 VITALS
SYSTOLIC BLOOD PRESSURE: 134 MMHG | HEART RATE: 111 BPM | HEIGHT: 69 IN | WEIGHT: 293 LBS | BODY MASS INDEX: 43.4 KG/M2 | DIASTOLIC BLOOD PRESSURE: 92 MMHG

## 2023-02-14 DIAGNOSIS — S42.402A LEFT ELBOW FRACTURE, CLOSED, INITIAL ENCOUNTER: Primary | ICD-10-CM

## 2023-02-14 RX ORDER — VALSARTAN AND HYDROCHLOROTHIAZIDE 80; 12.5 MG/1; MG/1
1 TABLET, FILM COATED ORAL DAILY
COMMUNITY
Start: 2023-02-06

## 2023-02-14 NOTE — PROGRESS NOTES
[facility] [place]  Yesika Hutchins [address] [place] [id number]       Nati Mendez  [id number]  1975    ORTHOPAEDIC SURGERY OUTPATIENT NOTE  2/14/2023      HISTORY:  49 y o  male  Presents for follow up on his left elbow  When we last saw him we recommended MRI, US and CT to evaluate his elbow  He reports continued pain over the medial elbow with grinding in the elbow with ROM  He denies numbness or tingling in the hand  He denies anterior elbow pain  He denies catching or locking of the elbow  He denies sensation of instability       Past Medical History:   Diagnosis Date   • Asthma        Past Surgical History:   Procedure Laterality Date   • FINGER SURGERY Right    • RI LAPS REPAIR HERNIA EXCEPT INCAL/INGUN REDUCIBLE N/A 7/27/2018    Procedure: LAPAROSCOPIC UMBILICAL HERNIA REPAIR WITH MESH;  Surgeon: Salma Joyner MD;  Location: [facility] OR;  Service: General       Social History     Socioeconomic History   • Marital status: Single     Spouse name: Not on file   • Number of children: Not on file   • Years of education: Not on file   • Highest education level: Not on file   Occupational History   • Not on file   Tobacco Use   • Smoking status: Never   • Smokeless tobacco: Never   Vaping Use   • Vaping Use: Never used   Substance and Sexual Activity   • Alcohol use: Yes     Comment: occasionally   • Drug use: No   • Sexual activity: Yes     Partners: Female     Birth control/protection: None   Other Topics Concern   • Not on file   Social History Narrative   • Not on file     Social Determinants of Health     Financial Resource Strain: Not on file   Food Insecurity: Not on file   Transportation Needs: Not on file   Physical Activity: Not on file   Stress: Not on file   Social Connections: Not on file   Intimate Partner Violence: Not on file   Housing Stability: Not on file       Family History   Problem Relation Age of Onset   • Hypertension Mother Patient's Medications   New Prescriptions    No medications on file   Previous Medications    ALBUTEROL (5 MG/ML) 0 5 % NEBULIZER SOLUTION    Take 2 5 mg by nebulization every 6 (six) hours as needed for wheezing    ALBUTEROL (PROVENTIL HFA,VENTOLIN HFA) 90 MCG/ACT INHALER    Inhale 2 puffs    AZELASTINE (ASTELIN) 0 1 % NASAL SPRAY        FEXOFENADINE (ALLEGRA) 180 MG TABLET    Take 180 mg by mouth daily    IBUPROFEN (MOTRIN) 800 MG TABLET    Take 800 mg by mouth 2 (two) times a day    LEVOCETIRIZINE (XYZAL) 5 MG TABLET    Take 5 mg by mouth every evening    LEVOCETIRIZINE (XYZAL) 5 MG TABLET    Take 5 mg by mouth    MONTELUKAST (SINGULAIR) 10 MG TABLET    Take 10 mg by mouth every evening    TRELEGY ELLIPTA 200-62 5-25 MCG/ACT AEPB INHALER    Inhale 1 puff daily    VALSARTAN-HYDROCHLOROTHIAZIDE (DIOVAN-HCT) 80-12 5 MG PER TABLET    Take 1 tablet by mouth daily   Modified Medications    No medications on file   Discontinued Medications    No medications on file       No Known Allergies     /92   Pulse (!) 111   Ht 5' 9" (1 753 m)   Wt 133 kg (293 lb)   BMI 43 27 kg/m²      REVIEW OF SYSTEMS:  Constitutional: Negative  HEENT: Negative  Respiratory: Negative  Skin: Negative  Neurological: Negative  Psychiatric/Behavioral: Negative  Musculoskeletal: Negative except for that mentioned in the HPI      /92   Pulse (!) 111   Ht 5' 9" (1 753 m)   Wt 133 kg (293 lb)   BMI 43 27 kg/m²   Gen: No acute distress, resting comfortably in bed  HEENT: Eyes clear, moist mucus membranes, hearing intact  Respiratory: No audible wheezing or stridor  Cardiovascular: Well Perfused peripherally, 2+ distal pulse  Abdomen: nondistended, no peritoneal signs     PHYSICAL EXAM:    LEFT ELBOW:    Appearance: normal    Flexion: 140 degrees  Extension: 0 degrees  Pronation: 80 degrees  Supination: 80 degrees  Crepitus over the joint medially with motion  Painful end range of extension    TTP Lateral Epicondyle: negative  TTP Medial Epicondyle: positive  TTP Olecranon: negative  TTP Radial Head: negative  TTP Biceps Tendon: negative  TTP anterior elbow/coranoid: positive    Strength:  Flexion: 5/5  Extension: 5/5  Pronation: 5/5  Supination: 5/5    Pain with resisted wrist extension: negative  Pain with resisted 3rd finger extension: negative  Pain with resisted wrist flexion: negative    Varus laxity: negative  Valgus laxity: negative  Milking maneuver: negative  Moving valgus stress test: negative    Cubital tunnel Tinel's: negative    Radial/median/ulnar nerve intact    <2 sec cap refill          IMAGING:  US of the left elbow available for review, demonstrates no findings suggestive of ulnar nerve compression/cubital tunnel syndrome  CT and MRI of the left elbow available for review, these demonstrate multiple loose bodies in the medial elbow and nonunion of chronic coronoid process fracture  ASSESSMENT AND PLAN:  49 y o  male  With left elbow arthritis, 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  He did have an injury in September of last year  We discussed that the coronoid fracture may have occurred upon his fall in September  However, the loose bodies medially are chronic degenerative changes  This is where the majority of his pain is, medially  His US was negative for cubital tunnel compression  He complains of no instability  We discussed that we may consider elbow removal of loose bodies, this would be open due to location of the loose bodies and proximity to the ulnar nerve, possible ulnar nerve transposition as well  Possible post operative course was discussed  He would like to consider his options and the recovery period  He will return to us as needed       Scribe Attestation      I,:  Sunni Villanueva PA-C am acting as a scribe while in the presence of the attending physician :       I,:  Comfort Shukla personally performed the services described in this documentation as scribed in my presence :

## 2023-02-22 ENCOUNTER — TELEPHONE (OUTPATIENT)
Dept: OBGYN CLINIC | Facility: MEDICAL CENTER | Age: 48
End: 2023-02-22

## 2023-02-22 ENCOUNTER — TELEPHONE (OUTPATIENT)
Dept: OBGYN CLINIC | Facility: OTHER | Age: 48
End: 2023-02-22

## 2023-02-22 DIAGNOSIS — S42.402A LEFT ELBOW FRACTURE, CLOSED, INITIAL ENCOUNTER: Primary | ICD-10-CM

## 2023-02-22 DIAGNOSIS — M24.022 LOOSE BODY IN LEFT ELBOW: ICD-10-CM

## 2023-02-22 NOTE — TELEPHONE ENCOUNTER
I called and discussed with the patient and his fiancée  He does report intermittent locking in the elbow and pain surrounding the elbow now  He also complains of pain radiating from the medial elbow shooting down to his wrist and hand  They did follow-up with Dr Berlin Jefferson at [facility] since the last visit with us  They are interested in another opinion before moving forward with any possible surgery for removal of loose bodies over the medial elbow

## 2023-02-22 NOTE — TELEPHONE ENCOUNTER
Caller: Patient     Doctor: n/a     Reason for call: Message relayed , understood  There is apt scheduled with   Dr Frey that they wish to keep and will check with WC to see if on panel     Call back#: n/a

## 2023-02-22 NOTE — TELEPHONE ENCOUNTER
Patient is being referred to a orthopedics  Please schedule accordingly      [address]   [phone]

## 2023-02-22 NOTE — TELEPHONE ENCOUNTER
I called twice and left a message for the patient to call us back  I will discuss with them  His note does document pain, grinding medial elbow, nonunion (old fracture) or coronoid process  He complained mainly of the pain and grinding medial elbow, not locking or catching frequently  He did not have signs of ulnar nerve entrapment on ultrasound or clinical exam (Tinel's sign ulnar nerve)    This phone message is part of the record and will document that he does have intermittent numbness and tingling in the hand and occasional locking, this was also documented in his initial evaluation  However, when we saw him on 2/14 his majority of his symptoms and complaints were in the medial elbow and grinding

## 2023-02-22 NOTE — TELEPHONE ENCOUNTER
Caller: Patient    Doctor: Anitha Costa    Reason for call:     Patient is requesting a call back to speak with daya Laguerre schedule an appointment for 2/28/23, but he is asking for a call back too      Call back#: [phone]

## 2023-02-22 NOTE — TELEPHONE ENCOUNTER
Caller: Patient    Doctor: Merissa Monteiro    Reason for call: Patient returning call to [facility]    Call back#: [phone]

## 2023-02-22 NOTE — TELEPHONE ENCOUNTER
Called and spoke w/pt and he was reading his chart  It is the opposite of what he told the doctor  He doesn't know who typed it in but it is the opposite and he would like to go over it with someone  His fiance, Samuel Anton who is with pt on speaker phone, basically they were reviewing notes to decide about the surgery  When they look at it in [facility] it is reversed from what he said  He has a left elbow fx and has a lot of pain, tingling, numbing, and  it locks once in a while  There is also a grinding sensation  These are all things that they had discussed at appt on 2/14/23 that are not listed  They would like to know why? And can this be revised? Please advise

## 2023-02-28 ENCOUNTER — TELEPHONE (OUTPATIENT)
Dept: OBGYN CLINIC | Facility: HOSPITAL | Age: 48
End: 2023-02-28

## 2023-02-28 NOTE — TELEPHONE ENCOUNTER
Caller: Self    Doctor: Rosario Watkins    Reason for call: Rescheduled appt from 3/16/23 to 5/3/23, that was the soonest  Should he be seen sooner?     Call back#: [phone]

## 2024-05-23 ENCOUNTER — EVALUATION (OUTPATIENT)
Dept: PHYSICAL THERAPY | Facility: CLINIC | Age: 49
End: 2024-05-23
Payer: OTHER MISCELLANEOUS

## 2024-05-23 DIAGNOSIS — G56.22 ENTRAPMENT OF LEFT ULNAR NERVE: ICD-10-CM

## 2024-05-23 DIAGNOSIS — Z98.890 S/P DECOMPRESSION OF ULNAR NERVE: Primary | ICD-10-CM

## 2024-05-23 PROCEDURE — 97140 MANUAL THERAPY 1/> REGIONS: CPT

## 2024-05-23 PROCEDURE — 97112 NEUROMUSCULAR REEDUCATION: CPT

## 2024-05-23 PROCEDURE — 97161 PT EVAL LOW COMPLEX 20 MIN: CPT

## 2024-05-23 NOTE — LETTER
May 28, 2024    Mark David Lazarus, MD  [address]  [address]  [place] [address]    Patient: Mike Yoo   YOB: 1975   Date of Visit: 2024     Encounter Diagnosis     ICD-10-CM    1. S/P decompression of ulnar nerve  Z98.890       2. Entrapment of left ulnar nerve  G56.22           Dear Dr. Lazarus:    Thank you for your recent referral of Mike Yoo. Please review the attached evaluation summary from Mike's recent visit.     Please verify that you agree with the plan of care by signing the attached order.     If you have any questions or concerns, please do not hesitate to call.     I sincerely appreciate the opportunity to share in the care of one of your patients and hope to have another opportunity to work with you in the near future.       Sincerely,    Lala Farfan, PT      Referring Provider:      I certify that I have read the below Plan of Care and certify the need for these services furnished under this plan of treatment while under my care.                    Mark David Lazarus, MD  [address]  [address]  [place] [address]  Via Fax: [phone]          PT Evaluation     Today's date: 2024  Patient name: Mike Yoo  : 1975  MRN: [id number]  Referring provider: Torres Armstrong MD  Dx:   Encounter Diagnosis     ICD-10-CM    1. S/P decompression of ulnar nerve  Z98.890       2. Entrapment of left ulnar nerve  G56.22           Start Time: 1500  Stop Time: 1545  Total time in clinic (min): 45 minutes    Assessment  Impairments: abnormal or restricted ROM, activity intolerance, impaired physical strength, lacks appropriate home exercise program and pain with function  Symptom irritability: moderate    Assessment details: Pt is a 48 y/o male presenting to outpatient PT s/p left eblow LCL reconstruction and ulnar nerve decompression on . Upon examination, pt presents with left elbow pain, decreased elbow AROM, decreased left elbow and   strength, and tenderness around incision sites. Functionally, these impairments have led to difficulty with lifting/carrying and performing his usual ADLs and work tasks. Pt currently has 5lb lifting restriction per referring provider and has been out of work since initial injury. Pt has good rehab potential to achieve stated goals and will benefit from skilled PT services to address above limitations in order to improve functional activity tolerance for ADLs and work duties.     Understanding of Dx/Px/POC: good     Prognosis: good    Goals  STG - 4 weeks  1. L elbow AROM improved to WNL compared to right with minimal to no pain.  2.  strength improved to at least 50 lbs with minimal to no pain.    LTG - 8 weeks  1. FOTO score will improve from 21 to 48 to demonstrate improved functional abilities.  2. Pt able to lift at least 5 lbs with L hand with minimal difficulty    Plan  Patient would benefit from: skilled physical therapy and PT eval  Planned modality interventions: cryotherapy    Planned therapy interventions: IASTM, joint mobilization, kinesiology taping, manual therapy, nerve gliding, neuromuscular re-education, patient/caregiver education, strengthening, stretching, therapeutic activities, therapeutic exercise, functional ROM exercises, graded exercise and home exercise program    Frequency: 2-3x week  Plan of Care beginning date: 5/23/2024  Plan of Care expiration date: 7/18/2024  Treatment plan discussed with: patient        Subjective Evaluation    History of Present Illness  Date of onset: 9/27/2022  Date of surgery: 4/9/2024  Mechanism of injury: surgery  Mechanism of injury: Pt had a fall at work on 9/27/22 and injured his left elbow. Due to issues with worker's comp, he did not get surgery until 4/9/24. Pt works for [employer] and has been out of work since the initial injury. He states he has lifting requirements of  lbs. Pt reports stiffness and sharp pain on the inside of his elbow. Had  previous physical therapy at a different location.   Quality of life: good    Patient Goals  Patient goals for therapy: decreased pain, independence with ADLs/IADLs, increased strength, increased motion and return to work    Pain  Current pain ratin  At best pain ratin  At worst pain ratin  Quality: dull ache, radiating and sharp  Relieving factors: ice and medications  Aggravating factors: lifting    Hand dominance: right    Treatments  Previous treatment: physical therapy        Objective     Observations     Additional Observation Details  Surgical site well healed, mild edema on medial elbow    Palpation     Additional Palpation Details  TTP along medial surgical site, flexor insertion    Active Range of Motion     Left Elbow   Flexion: 110 degrees   Extension: -10 degrees   Forearm supination: 70 degrees   Forearm pronation: 70 degrees     Right Elbow   Normal active range of motion    Additional Active Range of Motion Details  Pain reported with all left elbow AROM    Strength/Myotome Testing     Left Elbow   Flexion: 2-  Extension: 2-  Forearm supination: 2-  Forearm pronation: 2-    Right Elbow   Flexion: 5  Extension: 5    Additional Strength Details   strength - R 95 lbs, L 20 lbs  Pain reported with all motions             Precautions: s/p L elbow LCL reconstruction, debridement, ulnar nerve decompression 24, 5lb lifting restriction    POC expires Unit limit Auth Expiration date PT/OT + Visit Limit?    N/A Pending Pending                 Visit/Unit Tracking  AUTH Status:  Date               Pending Used 1               Remaining                  FOTO        Manuals             Elbow flex/ext/sup/pro PROM CG            STM for edema CG                                      Neuro Re-Ed             Pt education on HEP, POC, IE findings 8'                                                                                          Ther Ex             Elbow flex/ext PROM x10             Elbow pro/sup PROM x10            Elbow flex/ext AROM x10            Elbow pro/sup AROM x10                                                                Ther Activity                                       Gait Training                                       Modalities

## 2024-05-23 NOTE — PROGRESS NOTES
PT Evaluation     Today's date: 2024  Patient name: Mike Yoo  : 1975  MRN: [id number]  Referring provider: Torres Armstrong MD  Dx:   Encounter Diagnosis     ICD-10-CM    1. S/P decompression of ulnar nerve  Z98.890       2. Entrapment of left ulnar nerve  G56.22           Start Time: 1500  Stop Time: 1545  Total time in clinic (min): 45 minutes    Assessment  Impairments: abnormal or restricted ROM, activity intolerance, impaired physical strength, lacks appropriate home exercise program and pain with function  Symptom irritability: moderate    Assessment details: Pt is a 50 y/o male presenting to outpatient PT s/p left eblow LCL reconstruction and ulnar nerve decompression on . Upon examination, pt presents with left elbow pain, decreased elbow AROM, decreased left elbow and  strength, and tenderness around incision sites. Functionally, these impairments have led to difficulty with lifting/carrying and performing his usual ADLs and work tasks. Pt currently has 5lb lifting restriction per referring provider and has been out of work since initial injury. Pt has good rehab potential to achieve stated goals and will benefit from skilled PT services to address above limitations in order to improve functional activity tolerance for ADLs and work duties.     Understanding of Dx/Px/POC: good     Prognosis: good    Goals  STG - 4 weeks  1. L elbow AROM improved to WNL compared to right with minimal to no pain.  2.  strength improved to at least 50 lbs with minimal to no pain.    LTG - 8 weeks  1. FOTO score will improve from 21 to 48 to demonstrate improved functional abilities.  2. Pt able to lift at least 5 lbs with L hand with minimal difficulty    Plan  Patient would benefit from: skilled physical therapy and PT eval  Planned modality interventions: cryotherapy    Planned therapy interventions: IASTM, joint mobilization, kinesiology taping, manual therapy, nerve gliding, neuromuscular  re-education, patient/caregiver education, strengthening, stretching, therapeutic activities, therapeutic exercise, functional ROM exercises, graded exercise and home exercise program    Frequency: 2-3x week  Plan of Care beginning date: 2024  Plan of Care expiration date: 2024  Treatment plan discussed with: patient        Subjective Evaluation    History of Present Illness  Date of onset: 2022  Date of surgery: 2024  Mechanism of injury: surgery  Mechanism of injury: Pt had a fall at work on 22 and injured his left elbow. Due to issues with worker's comp, he did not get surgery until 24. Pt works for [employer] and has been out of work since the initial injury. He states he has lifting requirements of  lbs. Pt reports stiffness and sharp pain on the inside of his elbow. Had previous physical therapy at a different location.   Quality of life: good    Patient Goals  Patient goals for therapy: decreased pain, independence with ADLs/IADLs, increased strength, increased motion and return to work    Pain  Current pain ratin  At best pain ratin  At worst pain ratin  Quality: dull ache, radiating and sharp  Relieving factors: ice and medications  Aggravating factors: lifting    Hand dominance: right    Treatments  Previous treatment: physical therapy        Objective     Observations     Additional Observation Details  Surgical site well healed, mild edema on medial elbow    Palpation     Additional Palpation Details  TTP along medial surgical site, flexor insertion    Active Range of Motion     Left Elbow   Flexion: 110 degrees   Extension: -10 degrees   Forearm supination: 70 degrees   Forearm pronation: 70 degrees     Right Elbow   Normal active range of motion    Additional Active Range of Motion Details  Pain reported with all left elbow AROM    Strength/Myotome Testing     Left Elbow   Flexion: 2-  Extension: 2-  Forearm supination: 2-  Forearm pronation: 2-    Right Elbow    Flexion: 5  Extension: 5    Additional Strength Details   strength - R 95 lbs, L 20 lbs  Pain reported with all motions             Precautions: s/p L elbow LCL reconstruction, debridement, ulnar nerve decompression 4/9/24, 5lb lifting restriction    POC expires Unit limit Auth Expiration date PT/OT + Visit Limit?   7/18 N/A Pending Pending                 Visit/Unit Tracking  AUTH Status:  Date 5/23              Pending Used 1               Remaining                  FOTO        Manuals 5/23            Elbow flex/ext/sup/pro PROM CG            STM for edema CG                                      Neuro Re-Ed             Pt education on HEP, POC, IE findings 8'                                                                                          Ther Ex             Elbow flex/ext PROM x10            Elbow pro/sup PROM x10            Elbow flex/ext AROM x10            Elbow pro/sup AROM x10                                                                Ther Activity                                       Gait Training                                       Modalities

## 2024-05-23 NOTE — LETTER
May 28, 2024    Mark David Lazarus, MD  [address]  [address]  [facility] [address]    Patient: Mike Yoo   YOB: 1975   Date of Visit: 2024     Encounter Diagnosis     ICD-10-CM    1. S/P decompression of ulnar nerve  Z98.890       2. Entrapment of left ulnar nerve  G56.22           Dear Dr. Lazarus:    Thank you for your recent referral of Mike Yoo. Please review the attached evaluation summary from Mike's recent visit.     Please verify that you agree with the plan of care by signing the attached order.     If you have any questions or concerns, please do not hesitate to call.     I sincerely appreciate the opportunity to share in the care of one of your patients and hope to have another opportunity to work with you in the near future.       Sincerely,    Lala Farfan, PT      Referring Provider:      I certify that I have read the below Plan of Care and certify the need for these services furnished under this plan of treatment while under my care.                    Mark David Lazarus, MD  [address]  [address]  [facility] [address]  Via Fax: [phone]          PT Evaluation     Today's date: 2024  Patient name: Mike Yoo  : 1975  MRN: [id number]  Referring provider: Torres Armstrong MD  Dx:   Encounter Diagnosis     ICD-10-CM    1. S/P decompression of ulnar nerve  Z98.890       2. Entrapment of left ulnar nerve  G56.22           Start Time: 1500  Stop Time: 1545  Total time in clinic (min): 45 minutes    Assessment  Impairments: abnormal or restricted ROM, activity intolerance, impaired physical strength, lacks appropriate home exercise program and pain with function  Symptom irritability: moderate    Assessment details: Pt is a 50 y/o male presenting to outpatient PT s/p left eblow LCL reconstruction and ulnar nerve decompression on . Upon examination, pt presents with left elbow pain, decreased elbow AROM, decreased left elbow and   strength, and tenderness around incision sites. Functionally, these impairments have led to difficulty with lifting/carrying and performing his usual ADLs and work tasks. Pt currently has 5lb lifting restriction per referring provider and has been out of work since initial injury. Pt has good rehab potential to achieve stated goals and will benefit from skilled PT services to address above limitations in order to improve functional activity tolerance for ADLs and work duties.     Understanding of Dx/Px/POC: good     Prognosis: good    Goals  STG - 4 weeks  1. L elbow AROM improved to WNL compared to right with minimal to no pain.  2.  strength improved to at least 50 lbs with minimal to no pain.    LTG - 8 weeks  1. FOTO score will improve from 21 to 48 to demonstrate improved functional abilities.  2. Pt able to lift at least 5 lbs with L hand with minimal difficulty    Plan  Patient would benefit from: skilled physical therapy and PT eval  Planned modality interventions: cryotherapy    Planned therapy interventions: IASTM, joint mobilization, kinesiology taping, manual therapy, nerve gliding, neuromuscular re-education, patient/caregiver education, strengthening, stretching, therapeutic activities, therapeutic exercise, functional ROM exercises, graded exercise and home exercise program    Frequency: 2-3x week  Plan of Care beginning date: 5/23/2024  Plan of Care expiration date: 7/18/2024  Treatment plan discussed with: patient        Subjective Evaluation    History of Present Illness  Date of onset: 9/27/2022  Date of surgery: 4/9/2024  Mechanism of injury: surgery  Mechanism of injury: Pt had a fall at work on 9/27/22 and injured his left elbow. Due to issues with worker's comp, he did not get surgery until 4/9/24. Pt works for [employer] and has been out of work since the initial injury. He states he has lifting requirements of  lbs. Pt reports stiffness and sharp pain on the inside of his elbow. Had  previous physical therapy at a different location.   Quality of life: good    Patient Goals  Patient goals for therapy: decreased pain, independence with ADLs/IADLs, increased strength, increased motion and return to work    Pain  Current pain ratin  At best pain ratin  At worst pain ratin  Quality: dull ache, radiating and sharp  Relieving factors: ice and medications  Aggravating factors: lifting    Hand dominance: right    Treatments  Previous treatment: physical therapy        Objective     Observations     Additional Observation Details  Surgical site well healed, mild edema on medial elbow    Palpation     Additional Palpation Details  TTP along medial surgical site, flexor insertion    Active Range of Motion     Left Elbow   Flexion: 110 degrees   Extension: -10 degrees   Forearm supination: 70 degrees   Forearm pronation: 70 degrees     Right Elbow   Normal active range of motion    Additional Active Range of Motion Details  Pain reported with all left elbow AROM    Strength/Myotome Testing     Left Elbow   Flexion: 2-  Extension: 2-  Forearm supination: 2-  Forearm pronation: 2-    Right Elbow   Flexion: 5  Extension: 5    Additional Strength Details   strength - R 95 lbs, L 20 lbs  Pain reported with all motions             Precautions: s/p L elbow LCL reconstruction, debridement, ulnar nerve decompression 24, 5lb lifting restriction    POC expires Unit limit Auth Expiration date PT/OT + Visit Limit?    N/A Pending Pending                 Visit/Unit Tracking  AUTH Status:  Date               Pending Used 1               Remaining                  FOTO        Manuals             Elbow flex/ext/sup/pro PROM CG            STM for edema CG                                      Neuro Re-Ed             Pt education on HEP, POC, IE findings 8'                                                                                          Ther Ex             Elbow flex/ext PROM x10             Elbow pro/sup PROM x10            Elbow flex/ext AROM x10            Elbow pro/sup AROM x10                                                                Ther Activity                                       Gait Training                                       Modalities

## 2024-05-29 ENCOUNTER — OFFICE VISIT (OUTPATIENT)
Dept: PHYSICAL THERAPY | Facility: CLINIC | Age: 49
End: 2024-05-29
Payer: OTHER MISCELLANEOUS

## 2024-05-29 DIAGNOSIS — G56.22 ENTRAPMENT OF LEFT ULNAR NERVE: ICD-10-CM

## 2024-05-29 DIAGNOSIS — Z98.890 S/P DECOMPRESSION OF ULNAR NERVE: Primary | ICD-10-CM

## 2024-05-29 PROCEDURE — 97110 THERAPEUTIC EXERCISES: CPT

## 2024-05-29 PROCEDURE — 97140 MANUAL THERAPY 1/> REGIONS: CPT

## 2024-05-29 NOTE — PROGRESS NOTES
Daily Note     Today's date: 2024  Patient name: Mike Yoo  : 1975  MRN: [id number]  Referring provider: Lazarus, Mark David, MD  Dx:   Encounter Diagnosis     ICD-10-CM    1. S/P decompression of ulnar nerve  Z98.890       2. Entrapment of left ulnar nerve  G56.22           Start Time: 0930  Stop Time: 1010  Total time in clinic (min): 40 minutes    Subjective: Pt reports he is still very sore but has been doing his HEP.      Objective: See treatment diary below      Assessment: Tolerated treatment well. Patient demonstrated fatigue post treatment, exhibited good technique with therapeutic exercises, and would benefit from continued PT. The patient is provided with cuing and demonstration with initiation of program to ensure proper form and technique with exercises. He was challenged by program but noted only muscle soreness following session. Educated pt on use of modalities at home for symptom management.       Plan: Progress treatment as tolerated.       Precautions: s/p L elbow LCL reconstruction, debridement, ulnar nerve decompression 24, 5lb lifting restriction    POC expires Unit limit Auth Expiration date PT/OT + Visit Limit?    N/A  99                 Visit/Unit Tracking  AUTH Status:  Date               Used 1 2              Remaining                  FOTO        Manuals            Elbow flex/ext/sup/pro PROM CG CG           STM for edema CG CG           Scar mobs  CG                        Neuro Re-Ed             Pt education on HEP, POC, IE findings 8'                                                                                          Ther Ex             Elbow flex/ext PROM x10 2x10           Elbow pro/sup PROM x10 2x10           Elbow flex/ext AROM x10 x10           Elbow pro/sup AROM x10 x10           Wrist RD AROM  2x10           Wrist ext AROM  2x10            squeeze  Yellow 2x10                        Ther Activity                                        Gait Training                                       Modalities

## 2024-06-03 ENCOUNTER — OFFICE VISIT (OUTPATIENT)
Dept: PHYSICAL THERAPY | Facility: CLINIC | Age: 49
End: 2024-06-03
Payer: OTHER MISCELLANEOUS

## 2024-06-03 DIAGNOSIS — Z98.890 S/P DECOMPRESSION OF ULNAR NERVE: Primary | ICD-10-CM

## 2024-06-03 DIAGNOSIS — G56.22 ENTRAPMENT OF LEFT ULNAR NERVE: ICD-10-CM

## 2024-06-03 PROCEDURE — 97110 THERAPEUTIC EXERCISES: CPT

## 2024-06-03 PROCEDURE — 97140 MANUAL THERAPY 1/> REGIONS: CPT

## 2024-06-03 PROCEDURE — 97112 NEUROMUSCULAR REEDUCATION: CPT

## 2024-06-03 NOTE — PROGRESS NOTES
Daily Note     Today's date: 6/3/2024  Patient name: Mike Yoo  : 1975  MRN: [id number]  Referring provider: Lazarus, Mark David, MD  Dx:   Encounter Diagnosis     ICD-10-CM    1. S/P decompression of ulnar nerve  Z98.890       2. Entrapment of left ulnar nerve  G56.22           Start Time: 0930  Stop Time: 1017  Total time in clinic (min): 47 minutes    Subjective: Pt reports he is sore but states he has been doing his HEP, which is helping.       Objective: See treatment diary below      Assessment: Tolerated treatment well. Patient demonstrated fatigue post treatment, exhibited good technique with therapeutic exercises, and would benefit from continued PT. Continued to progress reps for mobility exercises to continue improving elbow ROM. He continues to be challenged by program but demonstrates improved AROM following tx interventions.       Plan: Progress treatment as tolerated.       Precautions: s/p L elbow LCL reconstruction, debridement, ulnar nerve decompression 24, 5lb lifting restriction    POC expires Unit limit Auth Expiration date PT/OT + Visit Limit?    N/A  99                 Visit/Unit Tracking  AUTH Status:  Date 5/23 5/29 6/3             Used 1 2 3             Remaining                  FOTO        Manuals 5/23 5/29 6/3          Elbow flex/ext/sup/pro PROM CG CG CG          STM for edema CG CG CG          Scar mobs  CG CG                       Neuro Re-Ed             Pt education  8'  4'          Ulnar NG   x10                                                                           Ther Ex             Elbow flex/ext PROM x10 2x10 2x10          Elbow pro/sup PROM x10 2x10 2x10          Elbow flex/ext AROM x10 x10 2x10          Elbow pro/sup AROM x10 x10 2x10          Wrist RD AROM  2x10 2x10          Wrist ext AROM  2x10 2x10           squeeze  Yellow 2x10 Yellow 2x10                       Ther Activity                                       Gait Training                                        Modalities

## 2024-06-05 ENCOUNTER — OFFICE VISIT (OUTPATIENT)
Dept: PHYSICAL THERAPY | Facility: CLINIC | Age: 49
End: 2024-06-05
Payer: OTHER MISCELLANEOUS

## 2024-06-05 DIAGNOSIS — G56.22 ENTRAPMENT OF LEFT ULNAR NERVE: ICD-10-CM

## 2024-06-05 DIAGNOSIS — Z98.890 S/P DECOMPRESSION OF ULNAR NERVE: Primary | ICD-10-CM

## 2024-06-05 PROCEDURE — 97112 NEUROMUSCULAR REEDUCATION: CPT

## 2024-06-05 PROCEDURE — 97140 MANUAL THERAPY 1/> REGIONS: CPT

## 2024-06-05 PROCEDURE — 97110 THERAPEUTIC EXERCISES: CPT

## 2024-06-05 NOTE — PROGRESS NOTES
"Daily Note     Today's date: 2024  Patient name: Mike Yoo  : 1975  MRN: [id number]  Referring provider: Lazarus, Mark David, MD  Dx:   Encounter Diagnosis     ICD-10-CM    1. S/P decompression of ulnar nerve  Z98.890       2. Entrapment of left ulnar nerve  G56.22           Start Time: 1015  Stop Time: 1102  Total time in clinic (min): 47 minutes    Subjective: Pt reports he is sore but has been using ice, which is helping.       Objective: See treatment diary below      Assessment: Tolerated treatment well. Patient demonstrated fatigue post treatment, exhibited good technique with therapeutic exercises, and would benefit from continued PT. Pt is able to progress through program with improved elbow AROM noted with less painful symptoms. Verbal cues provided to ensure proper form and technique with exercises.      Plan: Progress treatment as tolerated.       Precautions: s/p L elbow LCL reconstruction, debridement, ulnar nerve decompression 24, 5lb lifting restriction    POC expires Unit limit Auth Expiration date PT/OT + Visit Limit?    N/A  99                 Visit/Unit Tracking  AUTH Status:  Date 5/23 5/29 6/3 6/5            Used 1 2 3 4            Remaining                  FOTO        Manuals 5/23 5/29 6/3 6/5         Elbow flex/ext/sup/pro PROM CG CG CG CG         STM for edema CG CG CG CG         Scar mobs  CG CG CG                      Neuro Re-Ed             Pt education  8'  4' 3'         Ulnar NG   x10 x10                                                                          Ther Ex             Elbow flex/ext PROM x10 2x10 2x10 2x10         Elbow pro/sup PROM x10 2x10 2x10 2x10         Elbow flex/ext AROM x10 x10 2x10 2x10         Elbow pro/sup AROM x10 x10 2x10 2x10         Wrist RD AROM  2x10 2x10 2x10         Wrist ext AROM  2x10 2x10 2x10          squeeze  Yellow 2x10 Yellow 2x10 Yellow 2x10 red        Wrist flex/ext str    10x 10\" ea         Ther Activity           "                             Gait Training                                       Modalities

## 2024-06-10 ENCOUNTER — OFFICE VISIT (OUTPATIENT)
Dept: PHYSICAL THERAPY | Facility: CLINIC | Age: 49
End: 2024-06-10
Payer: OTHER MISCELLANEOUS

## 2024-06-10 DIAGNOSIS — Z98.890 S/P DECOMPRESSION OF ULNAR NERVE: Primary | ICD-10-CM

## 2024-06-10 DIAGNOSIS — G56.22 ENTRAPMENT OF LEFT ULNAR NERVE: ICD-10-CM

## 2024-06-10 PROCEDURE — 97112 NEUROMUSCULAR REEDUCATION: CPT

## 2024-06-10 PROCEDURE — 97110 THERAPEUTIC EXERCISES: CPT

## 2024-06-10 PROCEDURE — 97140 MANUAL THERAPY 1/> REGIONS: CPT

## 2024-06-10 NOTE — PROGRESS NOTES
Daily Note     Today's date: 6/10/2024  Patient name: Mike Yoo  : 1975  MRN: [id number]  Referring provider: Lazarus, Mark David, MD  Dx:   Encounter Diagnosis     ICD-10-CM    1. S/P decompression of ulnar nerve  Z98.890       2. Entrapment of left ulnar nerve  G56.22           Start Time: 1015  Stop Time: 1102  Total time in clinic (min): 47 minutes    Subjective: Pt reports he had pain and swelling over the weekend for no apparent reason and feels it still today.       Objective: See treatment diary below      Assessment: Tolerated treatment well. Patient demonstrated fatigue post treatment, exhibited good technique with therapeutic exercises, and would benefit from continued PT. Pt continues to progress through program with noted improvements in mobility and slight reduction in painful symptoms following session. Continued to progress reps and added resistance to wrist exercises to continue improving mobility and strength.       Plan: Progress treatment as tolerated.       Precautions: s/p L elbow LCL reconstruction, debridement, ulnar nerve decompression 24, 5lb lifting restriction    POC expires Unit limit Auth Expiration date PT/OT + Visit Limit?    N/A  99                 Visit/Unit Tracking  AUTH Status:  Date 5/23 5/29 6/3 6/5 6/10 - FOTO           Used 1 2 3 4 5           Remaining                  FOTO - done 6/10        Manuals 5/23 5/29 6/3 6/5 6/10        Elbow flex/ext/sup/pro PROM CG CG CG CG CG        STM for edema CG CG CG CG CG        Scar mobs  CG CG CG CG                     Neuro Re-Ed             Pt education  8'  4' 3' 4'        Ulnar NG   x10 x10 x10                                                                         Ther Ex             Elbow flex/ext PROM x10 2x10 2x10 2x10 2x10        Elbow pro/sup PROM x10 2x10 2x10 2x10 2x10        Elbow flex/ext AROM x10 x10 2x10 2x10 2x10        Elbow pro/sup AROM x10 x10 2x10 2x10 2x10        Wrist RD AROM  2x10 2x10 2x10  "2x10        Wrist ext AROM  2x10 2x10 2x10 1# 2x10        Wrist flex AROM     2x10         squeeze  Yellow 2x10 Yellow 2x10 Yellow 2x10 Red 2x10        Wrist flex/ext str    10x 10\" ea 10x 10\" ea        Ther Activity                                       Gait Training                                       Modalities                                                  "

## 2024-06-12 ENCOUNTER — OFFICE VISIT (OUTPATIENT)
Dept: PHYSICAL THERAPY | Facility: CLINIC | Age: 49
End: 2024-06-12
Payer: OTHER MISCELLANEOUS

## 2024-06-12 DIAGNOSIS — G56.22 ENTRAPMENT OF LEFT ULNAR NERVE: ICD-10-CM

## 2024-06-12 DIAGNOSIS — Z98.890 S/P DECOMPRESSION OF ULNAR NERVE: Primary | ICD-10-CM

## 2024-06-12 PROCEDURE — 97110 THERAPEUTIC EXERCISES: CPT

## 2024-06-12 PROCEDURE — 97112 NEUROMUSCULAR REEDUCATION: CPT

## 2024-06-12 PROCEDURE — 97140 MANUAL THERAPY 1/> REGIONS: CPT

## 2024-06-12 NOTE — PROGRESS NOTES
Daily Note     Today's date: 2024  Patient name: Mike Yoo  : 1975  MRN: [id number]  Referring provider: Lazarus, Mark David, MD  Dx:   Encounter Diagnosis     ICD-10-CM    1. S/P decompression of ulnar nerve  Z98.890       2. Entrapment of left ulnar nerve  G56.22           Start Time: 1015  Stop Time: 1100  Total time in clinic (min): 45 minutes    Subjective: Pt reports he was not too sore after last session.       Objective: See treatment diary below      Assessment: Tolerated treatment well. Patient demonstrated fatigue post treatment, exhibited good technique with therapeutic exercises, and would benefit from continued PT. Added in UBE to start session to continue improving elbow ROM. Pt continues to be challenged by program but notes only muscle soreness with mobility and strengthening exercises this session.       Plan: Progress treatment as tolerated.       Precautions: s/p L elbow LCL reconstruction, debridement, ulnar nerve decompression 24, 5lb lifting restriction    POC expires Unit limit Auth Expiration date PT/OT + Visit Limit?    N/A  99                 Visit/Unit Tracking  AUTH Status:  Date 5/23 5/29 6/3 6/5 6/10 - FOTO           Used 1 2 3 4 5 6          Remaining                  FOTO - done 6/10        Manuals 5/23 5/29 6/3 6/5 6/10 6/12       Elbow flex/ext/sup/pro PROM CG CG CG CG CG CG       STM for edema CG CG CG CG CG CG       Scar mobs  CG CG CG CG CG                    Neuro Re-Ed             Pt education  8'  4' 3' 4' 3'       Ulnar NG   x10 x10 x10 x10                                                                        Ther Ex             Elbow flex/ext PROM x10 2x10 2x10 2x10 2x10 2x10       Elbow pro/sup PROM x10 2x10 2x10 2x10 2x10 2x10       Elbow flex/ext AROM x10 x10 2x10 2x10 2x10 2x10       Elbow pro/sup AROM x10 x10 2x10 2x10 2x10 2x10       Wrist RD AROM  2x10 2x10 2x10 2x10 2x10       Wrist ext AROM  2x10 2x10 2x10 1# 2x10 1# 2x10      "  Wrist flex AROM     2x10 1# 2x10        squeeze  Yellow 2x10 Yellow 2x10 Yellow 2x10 Red 2x10 Red 2x10       Wrist flex/ext str    10x 10\" ea 10x 10\" ea 10x 10\" ea       UBE for ROM      4'       Ther Activity                                       Gait Training                                       Modalities                                                    "

## 2024-06-17 ENCOUNTER — OFFICE VISIT (OUTPATIENT)
Dept: PHYSICAL THERAPY | Facility: CLINIC | Age: 49
End: 2024-06-17
Payer: OTHER MISCELLANEOUS

## 2024-06-17 DIAGNOSIS — Z98.890 S/P DECOMPRESSION OF ULNAR NERVE: Primary | ICD-10-CM

## 2024-06-17 DIAGNOSIS — G56.22 ENTRAPMENT OF LEFT ULNAR NERVE: ICD-10-CM

## 2024-06-17 PROCEDURE — 97112 NEUROMUSCULAR REEDUCATION: CPT

## 2024-06-17 PROCEDURE — 97110 THERAPEUTIC EXERCISES: CPT

## 2024-06-17 PROCEDURE — 97140 MANUAL THERAPY 1/> REGIONS: CPT

## 2024-06-17 NOTE — PROGRESS NOTES
Daily Note     Today's date: 2024  Patient name: Mike Yoo  : 1975  MRN: [id number]  Referring provider: Lazarus, Mark David, MD  Dx:   Encounter Diagnosis     ICD-10-CM    1. S/P decompression of ulnar nerve  Z98.890       2. Entrapment of left ulnar nerve  G56.22           Start Time: 1015  Stop Time: 1100  Total time in clinic (min): 45 minutes    Subjective: Pt reports he has more stiffness in his elbow       Objective: See treatment diary below      Assessment: Tolerated treatment well. Pt demonstrates good effort throughout session. Minimal curing given to facilitate proper exercise performance and technique. He tolerates exercises as outlined below with improvement in ROM. He reports less difficulty with some things but still reports wrist pain at times.  Patient demonstrated fatigue post treatment, exhibited good technique with therapeutic exercises, and would benefit from continued PT      Plan: Continue per plan of care.      Precautions: s/p L elbow LCL reconstruction, debridement, ulnar nerve decompression 24, 5lb lifting restriction    POC expires Unit limit Auth Expiration date PT/OT + Visit Limit?    N/A  99                 Visit/Unit Tracking  AUTH Status:  Date 5/23 5/29 6/3 6/5 6/10 - FOTO          Used 1 2 3 4 5 6 7         Remaining                  FOTO - done 6/10        Manuals 5/23 5/29 6/3 6/5 6/10 6/12 6/17      Elbow flex/ext/sup/pro PROM CG CG CG CG CG CG KT      STM for edema CG CG CG CG CG CG KT      Scar mobs  CG CG CG CG CG KT                   Neuro Re-Ed             Pt education  8'  4' 3' 4' 3' 3'      Ulnar NG   x10 x10 x10 x10 x10                                                                       Ther Ex             Elbow flex/ext PROM x10 2x10 2x10 2x10 2x10 2x10 2x10      Elbow pro/sup PROM x10 2x10 2x10 2x10 2x10 2x10 2x10      Elbow flex/ext AROM x10 x10 2x10 2x10 2x10 2x10 2x10      Elbow pro/sup AROM x10 x10 2x10 2x10 2x10 2x10 2x10    "   Wrist RD AROM  2x10 2x10 2x10 2x10 2x10 2x10      Wrist ext AROM  2x10 2x10 2x10 1# 2x10 1# 2x10 1# 2x10      Wrist flex AROM     2x10 1# 2x10 1# 2x10       squeeze  Yellow 2x10 Yellow 2x10 Yellow 2x10 Red 2x10 Red 2x10 Red 2x10      Wrist flex/ext str    10x 10\" ea 10x 10\" ea 10x 10\" ea 10x 10\" ea      UBE for ROM      4' 4'      Ther Activity                                       Gait Training                                       Modalities                                                      "

## 2024-06-19 ENCOUNTER — OFFICE VISIT (OUTPATIENT)
Dept: PHYSICAL THERAPY | Facility: CLINIC | Age: 49
End: 2024-06-19
Payer: OTHER MISCELLANEOUS

## 2024-06-19 DIAGNOSIS — G56.22 ENTRAPMENT OF LEFT ULNAR NERVE: ICD-10-CM

## 2024-06-19 DIAGNOSIS — Z98.890 S/P DECOMPRESSION OF ULNAR NERVE: Primary | ICD-10-CM

## 2024-06-19 PROCEDURE — 97140 MANUAL THERAPY 1/> REGIONS: CPT

## 2024-06-19 PROCEDURE — 97110 THERAPEUTIC EXERCISES: CPT

## 2024-06-19 PROCEDURE — 97112 NEUROMUSCULAR REEDUCATION: CPT

## 2024-06-19 NOTE — PROGRESS NOTES
Daily Note     Today's date: 2024  Patient name: Mike Yoo  : 1975  MRN: [id number]  Referring provider: Lazarus, Mark David, MD  Dx:   Encounter Diagnosis     ICD-10-CM    1. S/P decompression of ulnar nerve  Z98.890       2. Entrapment of left ulnar nerve  G56.22                      Subjective: Pt reports he had some soreness in his elbow the other day but thinks it could be due to the weather.      Objective: See treatment diary below      Assessment: Tolerated treatment well. Patient demonstrated fatigue post treatment, exhibited good technique with therapeutic exercises, and would benefit from continued PT. Pt continues to progress well through program with less pain noted at end range elbow flexion and extension. Verbal cues provided to ensure proper form and technique with exercises. May progress to elbow strengthening next session within pt's tolerance.       Plan: Progress treatment as tolerated.       Precautions: s/p L elbow LCL reconstruction, debridement, ulnar nerve decompression 24, 5lb lifting restriction    POC expires Unit limit Auth Expiration date PT/OT + Visit Limit?    N/A  99                 Visit/Unit Tracking  AUTH Status:  Date 5/23 5/29 6/3 6/5 6/10 - FOTO        BOMN Used 1 2 3 4 5 6 7 8        Remaining                  FOTO - done 6/10        Manuals 5/23 5/29 6/3 6/5 6/10 6/12 6/17 6/19     Elbow flex/ext/sup/pro PROM CG CG CG CG CG CG KT CG     STM for edema CG CG CG CG CG CG KT CG     Scar mobs  CG CG CG CG CG KT CG                  Neuro Re-Ed             Pt education  8'  4' 3' 4' 3' 3' 3'     Ulnar NG   x10 x10 x10 x10 x10 x10                                                                      Ther Ex             Elbow flex/ext PROM x10 2x10 2x10 2x10 2x10 2x10 2x10 2x10     Elbow pro/sup PROM x10 2x10 2x10 2x10 2x10 2x10 2x10 2x10     Elbow flex/ext AROM x10 x10 2x10 2x10 2x10 2x10 2x10 2x10     Elbow pro/sup AROM x10 x10 2x10 2x10 2x10  "2x10 2x10 2x10     Wrist RD AROM  2x10 2x10 2x10 2x10 2x10 2x10 2x10     Wrist ext AROM  2x10 2x10 2x10 1# 2x10 1# 2x10 1# 2x10 1# 2x10     Wrist flex AROM     2x10 1# 2x10 1# 2x10 1# 2x10      squeeze  Yellow 2x10 Yellow 2x10 Yellow 2x10 Red 2x10 Red 2x10 Red 2x10 Red 2x10     Wrist flex/ext str    10x 10\" ea 10x 10\" ea 10x 10\" ea 10x 10\" ea 10x 10\" ea     UBE for ROM      4' 4' 4'     Ther Activity                                       Gait Training                                       Modalities                                                        "

## 2024-06-24 ENCOUNTER — OFFICE VISIT (OUTPATIENT)
Dept: PHYSICAL THERAPY | Facility: CLINIC | Age: 49
End: 2024-06-24
Payer: OTHER MISCELLANEOUS

## 2024-06-24 DIAGNOSIS — G56.22 ENTRAPMENT OF LEFT ULNAR NERVE: ICD-10-CM

## 2024-06-24 DIAGNOSIS — Z98.890 S/P DECOMPRESSION OF ULNAR NERVE: Primary | ICD-10-CM

## 2024-06-24 PROCEDURE — 97140 MANUAL THERAPY 1/> REGIONS: CPT

## 2024-06-24 PROCEDURE — 97110 THERAPEUTIC EXERCISES: CPT

## 2024-06-24 PROCEDURE — 97112 NEUROMUSCULAR REEDUCATION: CPT

## 2024-06-24 NOTE — PROGRESS NOTES
Daily Note     Today's date: 2024  Patient name: Mike Yoo  : 1975  MRN: [id number]  Referring provider: Lazarus, Mark David, MD  Dx:   Encounter Diagnosis     ICD-10-CM    1. S/P decompression of ulnar nerve  Z98.890       2. Entrapment of left ulnar nerve  G56.22                      Subjective: Pt reports it feels a little stiff today but overall its getting better.       Objective: See treatment diary below      Assessment: Tolerated treatment well. Pt demonstrates good effort throughout session. Minimal curing given to facilitate proper exercise performance and technique. Tenderness on the medial side os his elbow and between the distal end of both scars. He tolerates increased  resistance and increased weights today. Patient demonstrated fatigue post treatment, exhibited good technique with therapeutic exercises, and would benefit from continued PT      Plan: Continue per plan of care.      Precautions: s/p L elbow LCL reconstruction, debridement, ulnar nerve decompression 24, 5lb lifting restriction    POC expires Unit limit Auth Expiration date PT/OT + Visit Limit?    N/A  99                 Visit/Unit Tracking  AUTH Status:  Date 5/23 5/29 6/3 6/5 6/10 - FOTO       BOMN Used 1 2 3 4 5 6 7 8 9       Remaining                  FOTO - done 6/10        Manuals 5/23 5/29 6/3 6/5 6/10 6/12 6/17 6/19 6/24    Elbow flex/ext/sup/pro PROM CG CG CG CG CG CG KT CG KT    STM for edema CG CG CG CG CG CG KT CG KT    Scar mobs  CG CG CG CG CG KT CG KT                  Neuro Re-Ed             Pt education  8'  4' 3' 4' 3' 3' 3' 3'    Ulnar NG   x10 x10 x10 x10 x10 x10 x10                                                                     Ther Ex             Elbow flex/ext PROM x10 2x10 2x10 2x10 2x10 2x10 2x10 2x10 2x10    Elbow pro/sup PROM x10 2x10 2x10 2x10 2x10 2x10 2x10 2x10 2x10    Elbow flex/ext AROM x10 x10 2x10 2x10 2x10 2x10 2x10 2x10 2x10    Elbow pro/sup AROM  "x10 x10 2x10 2x10 2x10 2x10 2x10 2x10 2x10    Wrist RD AROM  2x10 2x10 2x10 2x10 2x10 2x10 2x10 2x10 1#    Wrist ext AROM  2x10 2x10 2x10 1# 2x10 1# 2x10 1# 2x10 1# 2x10 2# 2x10    Wrist flex AROM     2x10 1# 2x10 1# 2x10 1# 2x10 2# 2x10     squeeze  Yellow 2x10 Yellow 2x10 Yellow 2x10 Red 2x10 Red 2x10 Red 2x10 Red 2x10 Red 2x10    Wrist flex/ext str    10x 10\" ea 10x 10\" ea 10x 10\" ea 10x 10\" ea 10x 10\" ea 10x10\" ea    UBE for ROM      4' 4' 4' 4'    Ther Activity                                       Gait Training                                       Modalities                                                          "

## 2024-06-26 ENCOUNTER — OFFICE VISIT (OUTPATIENT)
Dept: PHYSICAL THERAPY | Facility: CLINIC | Age: 49
End: 2024-06-26
Payer: OTHER MISCELLANEOUS

## 2024-06-26 DIAGNOSIS — G56.22 ENTRAPMENT OF LEFT ULNAR NERVE: ICD-10-CM

## 2024-06-26 DIAGNOSIS — Z98.890 S/P DECOMPRESSION OF ULNAR NERVE: Primary | ICD-10-CM

## 2024-06-26 PROCEDURE — 97110 THERAPEUTIC EXERCISES: CPT

## 2024-06-26 PROCEDURE — 97140 MANUAL THERAPY 1/> REGIONS: CPT

## 2024-06-26 PROCEDURE — 97112 NEUROMUSCULAR REEDUCATION: CPT

## 2024-06-26 NOTE — PROGRESS NOTES
"Daily Note     Today's date: 2024  Patient name: Mike Yoo  : 1975  MRN: [id number]  Referring provider: Lazarus, Mark David, MD  Dx:   Encounter Diagnosis     ICD-10-CM    1. S/P decompression of ulnar nerve  Z98.890       2. Entrapment of left ulnar nerve  G56.22           Start Time: 1015  Stop Time: 1100  Total time in clinic (min): 45 minutes    Subjective: Pt reports he is a little sore around his elbow with some stiffness. He reports he sees the doctor on Friday.       Objective: See treatment diary below      Assessment: Tolerated treatment well. Patient demonstrated fatigue post treatment, exhibited good technique with therapeutic exercises, and would benefit from continued PT. Pt continues to progress through program with improved elbow AROM noted with less pain at end range of motion. He continues to be challenged by program but notes a reduction in stiffness noted following session. Plan to progress elbow strengthening activity next session.      Plan: Progress treatment as tolerated.       Precautions: s/p L elbow LCL reconstruction, debridement, ulnar nerve decompression 24, 5lb lifting restriction    POC expires Unit limit Auth Expiration date PT/OT + Visit Limit?    N/A  99                 Visit/Unit Tracking  AUTH Status:  Date 5/23 5/29 6/3 6/5 6/10 - FOTO      BOMN Used 1 2 3 4 5 6 7 8 9 10      Remaining                  FOTO - done 6/10        Manuals  5/29 6/3 6/5 6/10 6/12 6/17 6/19 6/24 6/26   Elbow flex/ext/sup/pro PROM  CG CG CG CG CG KT CG KT CG   STM for edema  CG CG CG CG CG KT CG KT CG   Scar mobs  CG CG CG CG CG KT CG KT  CG                Neuro Re-Ed             Pt education    4' 3' 4' 3' 3' 3' 3'    Ulnar NG   x10 x10 x10 x10 x10 x10 x10 x10    squeeze          Red 2x10 5\"   Bicep curl             Tricep ext                                       Ther Ex             Elbow flex/ext PROM HEP 2x10 2x10 2x10 2x10 2x10 2x10 2x10 2x10 " "2x10   Elbow pro/sup PROM HEP 2x10 2x10 2x10 2x10 2x10 2x10 2x10 2x10 2x10   Elbow flex/ext AROM  x10 2x10 2x10 2x10 2x10 2x10 2x10 2x10 2x10   Elbow pro/sup AROM  x10 2x10 2x10 2x10 2x10 2x10 2x10 2x10 2x10   Wrist RD AROM  2x10 2x10 2x10 2x10 2x10 2x10 2x10 2x10 1# 2x10 1#   Wrist ext AROM  2x10 2x10 2x10 1# 2x10 1# 2x10 1# 2x10 1# 2x10 2# 2x10 2# 2x10   Wrist flex AROM     2x10 1# 2x10 1# 2x10 1# 2x10 2# 2x10 2# 2x10   Wrist flex/ext str    10x 10\" ea 10x 10\" ea 10x 10\" ea 10x 10\" ea 10x 10\" ea 10x10\" ea 10x 10\" ea   UBE for ROM      4' 4' 4' 4' 4'   Ther Activity                                       Gait Training                                       Modalities                                                            "

## 2024-07-01 ENCOUNTER — OFFICE VISIT (OUTPATIENT)
Dept: PHYSICAL THERAPY | Facility: CLINIC | Age: 49
End: 2024-07-01
Payer: OTHER MISCELLANEOUS

## 2024-07-01 DIAGNOSIS — Z98.890 S/P DECOMPRESSION OF ULNAR NERVE: Primary | ICD-10-CM

## 2024-07-01 DIAGNOSIS — G56.22 ENTRAPMENT OF LEFT ULNAR NERVE: ICD-10-CM

## 2024-07-01 PROCEDURE — 97140 MANUAL THERAPY 1/> REGIONS: CPT

## 2024-07-01 PROCEDURE — 97112 NEUROMUSCULAR REEDUCATION: CPT

## 2024-07-01 PROCEDURE — 97110 THERAPEUTIC EXERCISES: CPT

## 2024-07-01 NOTE — PROGRESS NOTES
"Daily Note     Today's date: 2024  Patient name: Mike Yoo  : 1975  MRN: [id number]  Referring provider: Lazarus, Mark David, MD  Dx:   Encounter Diagnosis     ICD-10-CM    1. S/P decompression of ulnar nerve  Z98.890       2. Entrapment of left ulnar nerve  G56.22           Start Time: 1100  Stop Time: 1145  Total time in clinic (min): 45 minutes    Subjective: Pt reports he did not see his doctor because the appt days got messed up. Follow-up is now scheduled for the .       Objective: See treatment diary below      Assessment: Tolerated treatment well. Pt demonstrates good effort throughout session. Minimal curing given to facilitate proper exercise performance and technique. He tolerates additions of bicep curls and tricep extensions with TB this session. Patient demonstrated fatigue post treatment, exhibited good technique with therapeutic exercises, and would benefit from continued PT      Plan: Progress treatment as tolerated.       Precautions: s/p L elbow LCL reconstruction, debridement, ulnar nerve decompression 24, 5lb lifting restriction    POC expires Unit limit Auth Expiration date PT/OT + Visit Limit?    N/A  99                 Visit/Unit Tracking  AUTH Status:  Date 5/23 5/29 6/3 6/5 6/10 - FOTO     BOMN Used 1 2 3 4 5 6 7 8 9 10 11     Remaining                  FOTO - done 6/10        Manuals 7/1  6/3 6/5 6/10 6/12 6/17 6/19 6/24 6/26   Elbow flex/ext/sup/pro PROM   CG CG CG CG KT CG KT CG   STM for edema   CG CG CG CG KT CG KT CG   Scar mobs KT  CG CG CG CG KT CG KT  CG                Neuro Re-Ed             Pt education    4' 3' 4' 3' 3' 3' 3'    Ulnar NG x10  x10 x10 x10 x10 x10 x10 x10 x10    squeeze Green 2x10 5\"         Red 2x10 5\"   Bicep curl 1# 2x10            Tricep ext Grn 2x10                                      Ther Ex             Elbow flex/ext PROM 2x10  2x10 2x10 2x10 2x10 2x10 2x10 2x10 2x10   Elbow pro/sup PROM 2x10  " "2x10 2x10 2x10 2x10 2x10 2x10 2x10 2x10   Elbow flex/ext AROM 2x10  2x10 2x10 2x10 2x10 2x10 2x10 2x10 2x10   Elbow pro/sup AROM 2x10  2x10 2x10 2x10 2x10 2x10 2x10 2x10 2x10   Wrist RD AROM 2x10 1#  2x10 2x10 2x10 2x10 2x10 2x10 2x10 1# 2x10 1#   Wrist ext AROM 2# 2x10  2x10 2x10 1# 2x10 1# 2x10 1# 2x10 1# 2x10 2# 2x10 2# 2x10   Wrist flex AROM 2# 2x10    2x10 1# 2x10 1# 2x10 1# 2x10 2# 2x10 2# 2x10   Wrist flex/ext str 10x 10\" ea   10x 10\" ea 10x 10\" ea 10x 10\" ea 10x 10\" ea 10x 10\" ea 10x10\" ea 10x 10\" ea   UBE for ROM 4'     4' 4' 4' 4' 4'   Ther Activity                                       Gait Training                                       Modalities                                                              "

## 2024-07-03 ENCOUNTER — OFFICE VISIT (OUTPATIENT)
Dept: PHYSICAL THERAPY | Facility: CLINIC | Age: 49
End: 2024-07-03
Payer: OTHER MISCELLANEOUS

## 2024-07-03 DIAGNOSIS — Z98.890 S/P DECOMPRESSION OF ULNAR NERVE: Primary | ICD-10-CM

## 2024-07-03 DIAGNOSIS — G56.22 ENTRAPMENT OF LEFT ULNAR NERVE: ICD-10-CM

## 2024-07-03 PROCEDURE — 97112 NEUROMUSCULAR REEDUCATION: CPT

## 2024-07-03 PROCEDURE — 97140 MANUAL THERAPY 1/> REGIONS: CPT

## 2024-07-03 PROCEDURE — 97110 THERAPEUTIC EXERCISES: CPT

## 2024-07-03 NOTE — PROGRESS NOTES
"Daily Note     Today's date: 7/3/2024  Patient name: Mike Yoo  : 1975  MRN: [id number]  Referring provider: Lazarus, Mark David, MD  Dx:   Encounter Diagnosis     ICD-10-CM    1. S/P decompression of ulnar nerve  Z98.890       2. Entrapment of left ulnar nerve  G56.22           Start Time: 1100  Stop Time: 1145  Total time in clinic (min): 45 minutes    Subjective: Pt reports he was a little sore after progressions last session.       Objective: See treatment diary below      Assessment: Tolerated treatment well. Patient demonstrated fatigue post treatment, exhibited good technique with therapeutic exercises, and would benefit from continued PT. Continued with interventions as noted emphasizing improving functional strength and mobility. He continues to be challenged by program but has less tenderness around elbow during activity and with palpation.       Plan: Progress treatment as tolerated.       Precautions: s/p L elbow LCL reconstruction, debridement, ulnar nerve decompression 24, 5lb lifting restriction    POC expires Unit limit Auth Expiration date PT/OT + Visit Limit?    N/A  99                 Visit/Unit Tracking  AUTH Status:  Date 5/23 5/29 6/3 6/5 6/10 - FOTO     BOMN Used 1 2 3 4 5 6 7 8 9 10 11     Remaining                  FOTO - done 6/10        Manuals 7/1 7/3  6/5 6/10 6/12 6/17 6/19 6/24 6/26   Elbow flex/ext/sup/pro PROM    CG CG CG KT CG KT CG   STM for edema  CG  CG CG CG KT CG KT CG   Scar mobs KT CG  CG CG CG KT CG KT  CG                Neuro Re-Ed             Pt education     3' 4' 3' 3' 3' 3'    Ulnar NG x10 x10  x10 x10 x10 x10 x10 x10 x10    squeeze Green 2x10 5\" Green 2x10 5\"        Red 2x10 5\"   Bicep curl 1# 2x10 1# 2x10           Tricep ext Grn 2x10 Grn 2x10                                     Ther Ex             Elbow flex/ext PROM 2x10 HEP  2x10 2x10 2x10 2x10 2x10 2x10 2x10   Elbow pro/sup PROM 2x10 HEP  2x10 2x10 2x10 2x10 " "2x10 2x10 2x10   Elbow flex/ext AROM 2x10 2x10  2x10 2x10 2x10 2x10 2x10 2x10 2x10   Elbow pro/sup AROM 2x10 2x10  2x10 2x10 2x10 2x10 2x10 2x10 2x10   Wrist RD AROM 2x10 1# 2x10 1#  2x10 2x10 2x10 2x10 2x10 2x10 1# 2x10 1#   Wrist ext AROM 2# 2x10 2# 2x10  2x10 1# 2x10 1# 2x10 1# 2x10 1# 2x10 2# 2x10 2# 2x10   Wrist flex AROM 2# 2x10 2# 2x10   2x10 1# 2x10 1# 2x10 1# 2x10 2# 2x10 2# 2x10   Wrist flex/ext str 10x 10\" ea 10x 10\" ea  10x 10\" ea 10x 10\" ea 10x 10\" ea 10x 10\" ea 10x 10\" ea 10x10\" ea 10x 10\" ea   UBE for ROM 4' 4'    4' 4' 4' 4' 4'   Ther Activity                                       Gait Training                                       Modalities                                                                "

## 2024-07-08 ENCOUNTER — OFFICE VISIT (OUTPATIENT)
Dept: PHYSICAL THERAPY | Facility: CLINIC | Age: 49
End: 2024-07-08
Payer: OTHER MISCELLANEOUS

## 2024-07-08 DIAGNOSIS — Z98.890 S/P DECOMPRESSION OF ULNAR NERVE: Primary | ICD-10-CM

## 2024-07-08 DIAGNOSIS — G56.22 ENTRAPMENT OF LEFT ULNAR NERVE: ICD-10-CM

## 2024-07-08 PROCEDURE — 97110 THERAPEUTIC EXERCISES: CPT

## 2024-07-08 PROCEDURE — 97140 MANUAL THERAPY 1/> REGIONS: CPT

## 2024-07-08 PROCEDURE — 97112 NEUROMUSCULAR REEDUCATION: CPT

## 2024-07-08 NOTE — PROGRESS NOTES
"Daily Note     Today's date: 2024  Patient name: Mike Yoo  : 1975  MRN: [id number]  Referring provider: Lazarus, Mark David, MD  Dx:   Encounter Diagnosis     ICD-10-CM    1. S/P decompression of ulnar nerve  Z98.890       2. Entrapment of left ulnar nerve  G56.22                      Subjective: Pt reports tenderness in his elbow.       Objective: See treatment diary below      Assessment: Tolerated treatment well. Pt demonstrates good effort throughout session. Minimal curing given to facilitate proper exercise performance and technique. He reports tenderness with palpation. Increase in wrist pain with  strengthening but he reports this usually happens. Patient demonstrated fatigue post treatment, exhibited good technique with therapeutic exercises, and would benefit from continued PT      Plan: Continue per plan of care.      Precautions: s/p L elbow LCL reconstruction, debridement, ulnar nerve decompression 24, 5lb lifting restriction    POC expires Unit limit Auth Expiration date PT/OT + Visit Limit?    N/A  99                 Visit/Unit Tracking  AUTH Status:  Date 5/23 5/29 6/3 6/5 6/10 - FOTO    BOMN Used 1 2 3 4 5 6 7 8 9 10 11 12    Remaining                  FOTO - done 6/10        Manuals 7/1 7/3 7/8   6/12 6/17 6/19 6/24 6/26   Elbow flex/ext/sup/pro PROM      CG KT CG KT CG   STM for edema  CG KT   CG KT CG KT CG   Scar mobs KT CG KT   CG KT CG KT  CG                Neuro Re-Ed             Pt education       3' 3' 3' 3'    Ulnar NG x10 x10 x10   x10 x10 x10 x10 x10    squeeze Green 2x10 5\" Green 2x10 5\" Green 2x10 5\"       Red 2x10 5\"   Bicep curl 1# 2x10 1# 2x10 1# 2x10          Tricep ext Grn 2x10 Grn 2x10 Grn 2x10                                    Ther Ex             Elbow flex/ext PROM 2x10 HEP    2x10 2x10 2x10 2x10 2x10   Elbow pro/sup PROM 2x10 HEP    2x10 2x10 2x10 2x10 2x10   Elbow flex/ext AROM 2x10 2x10 2x10   2x10 2x10 " "2x10 2x10 2x10   Elbow pro/sup AROM 2x10 2x10 2x10 1#   2x10 2x10 2x10 2x10 2x10   Wrist RD AROM 2x10 1# 2x10 1# 2x10 1#   2x10 2x10 2x10 2x10 1# 2x10 1#   Wrist ext AROM 2# 2x10 2# 2x10 2# 2x10   1# 2x10 1# 2x10 1# 2x10 2# 2x10 2# 2x10   Wrist flex AROM 2# 2x10 2# 2x10 2# 2x10   1# 2x10 1# 2x10 1# 2x10 2# 2x10 2# 2x10   Wrist flex/ext str 10x 10\" ea 10x 10\" ea 10x 10\" ea   10x 10\" ea 10x 10\" ea 10x 10\" ea 10x10\" ea 10x 10\" ea   UBE for ROM 4' 4' 4'   4' 4' 4' 4' 4'   Ther Activity                                       Gait Training                                       Modalities                                                                  "

## 2024-07-10 ENCOUNTER — OFFICE VISIT (OUTPATIENT)
Dept: PHYSICAL THERAPY | Facility: CLINIC | Age: 49
End: 2024-07-10
Payer: OTHER MISCELLANEOUS

## 2024-07-10 DIAGNOSIS — G56.22 ENTRAPMENT OF LEFT ULNAR NERVE: ICD-10-CM

## 2024-07-10 DIAGNOSIS — Z98.890 S/P DECOMPRESSION OF ULNAR NERVE: Primary | ICD-10-CM

## 2024-07-10 PROCEDURE — 97140 MANUAL THERAPY 1/> REGIONS: CPT

## 2024-07-10 PROCEDURE — 97110 THERAPEUTIC EXERCISES: CPT

## 2024-07-10 PROCEDURE — 97112 NEUROMUSCULAR REEDUCATION: CPT

## 2024-07-10 NOTE — PROGRESS NOTES
"Daily Note     Today's date: 7/10/2024  Patient name: Mike Yoo  : 1975  MRN: [id number]  Referring provider: Lazarus, Mark David, MD  Dx:   Encounter Diagnosis     ICD-10-CM    1. S/P decompression of ulnar nerve  Z98.890       2. Entrapment of left ulnar nerve  G56.22                      Subjective: Pt reports he has a lot of cracking in his elbow today.       Objective: See treatment diary below      Assessment: Tolerated treatment well. Pt demonstrates good effort throughout session. Minimal curing given to facilitate proper exercise performance and technique. He reports some cracking in his elbow and feeling a little achy today, His scars still remain sensitive. He tolerates 2# for bicep curls this visit. Some wrist pain present after radial deviation. Patient demonstrated fatigue post treatment, exhibited good technique with therapeutic exercises, and would benefit from continued PT      Plan: Continue per plan of care.      Precautions: s/p L elbow LCL reconstruction, debridement, ulnar nerve decompression 24, 5lb lifting restriction    POC expires Unit limit Auth Expiration date PT/OT + Visit Limit?    N/A  99                 Visit/Unit Tracking  AUTH Status:  Date 7/10   6/5 6/10 - FOTO    BOMN Used 13   4 5 6 7 8 9 10 11 12    Remaining                  FOTO - done 6/10        Manuals 7/1 7/3 7/8 7/10  6/12 6/17 6/19 6/24 6/26   Elbow flex/ext/sup/pro PROM      CG KT CG KT CG   STM for edema  CG KT KT  CG KT CG KT CG   Scar mobs KT CG KT KT  CG KT CG KT  CG                Neuro Re-Ed             Pt education       3' 3' 3' 3'    Ulnar NG x10 x10 x10 x10  x10 x10 x10 x10 x10    squeeze Green 2x10 5\" Green 2x10 5\" Green 2x10 5\" Green 2x10 5\"      Red 2x10 5\"   Bicep curl 1# 2x10 1# 2x10 1# 2x10 2# 2x10         Tricep ext Grn 2x10 Grn 2x10 Grn 2x10 Grn 2x10                                   Ther Ex             Elbow flex/ext PROM 2x10 HEP    2x10 " "2x10 2x10 2x10 2x10   Elbow pro/sup PROM 2x10 HEP    2x10 2x10 2x10 2x10 2x10   Elbow flex/ext AROM 2x10 2x10 2x10   2x10 2x10 2x10 2x10 2x10   Elbow pro/sup AROM 2x10 2x10 2x10 1# 2x10 1#  2x10 2x10 2x10 2x10 2x10   Wrist RD AROM 2x10 1# 2x10 1# 2x10 1# 2x10 1#  2x10 2x10 2x10 2x10 1# 2x10 1#   Wrist ext AROM 2# 2x10 2# 2x10 2# 2x10 2# 2x10  1# 2x10 1# 2x10 1# 2x10 2# 2x10 2# 2x10   Wrist flex AROM 2# 2x10 2# 2x10 2# 2x10 2# 2x10  1# 2x10 1# 2x10 1# 2x10 2# 2x10 2# 2x10   Wrist flex/ext str 10x 10\" ea 10x 10\" ea 10x 10\" ea 10x 10\" ea  10x 10\" ea 10x 10\" ea 10x 10\" ea 10x10\" ea 10x 10\" ea   UBE for ROM 4' 4' 4' 4'  4' 4' 4' 4' 4'   Ther Activity                                       Gait Training                                       Modalities                                                                    "

## 2024-07-15 ENCOUNTER — OFFICE VISIT (OUTPATIENT)
Dept: PHYSICAL THERAPY | Facility: CLINIC | Age: 49
End: 2024-07-15
Payer: OTHER MISCELLANEOUS

## 2024-07-15 DIAGNOSIS — Z98.890 S/P DECOMPRESSION OF ULNAR NERVE: Primary | ICD-10-CM

## 2024-07-15 DIAGNOSIS — G56.22 ENTRAPMENT OF LEFT ULNAR NERVE: ICD-10-CM

## 2024-07-15 PROCEDURE — 97140 MANUAL THERAPY 1/> REGIONS: CPT

## 2024-07-15 PROCEDURE — 97110 THERAPEUTIC EXERCISES: CPT

## 2024-07-15 PROCEDURE — 97112 NEUROMUSCULAR REEDUCATION: CPT

## 2024-07-15 NOTE — PROGRESS NOTES
"Daily Note     Today's date: 7/15/2024  Patient name: Mike Yoo  : 1975  MRN: [id number]  Referring provider: Lazarus, Mark David, MD  Dx:   Encounter Diagnosis     ICD-10-CM    1. S/P decompression of ulnar nerve  Z98.890       2. Entrapment of left ulnar nerve  G56.22           Start Time: 1056  Stop Time: 1144  Total time in clinic (min): 48 minutes    Subjective: Pt reports his elbow feels a little more puffy today, pain persists.        Objective: See treatment diary below      Assessment: Tolerated treatment well. Pt demonstrates good effort throughout session. Minimal curing given to facilitate proper exercise performance and technique. He reports a little more discomfort with sup/pron today. Pain continues in elbow with palpable swelling between olecranon process and medial epicondyle. Tenderness around scars noted. He may tolerate progressions if pain allows, should the doctor increase his lifting restriction weight.  Patient demonstrated fatigue post treatment, exhibited good technique with therapeutic exercises, and would benefit from continued PT      Plan: Continue per plan of care.      Precautions: s/p L elbow LCL reconstruction, debridement, ulnar nerve decompression 24, 5lb lifting restriction    POC expires Unit limit Auth Expiration date PT/OT + Visit Limit?    N/A  99                 Visit/Unit Tracking  AUTH Status:  Date 7/10 7/15  6/5 6/10 - FOTO    BOMN Used 13 14  4 5 6 7 8 9 10 11 12    Remaining                  FOTO - done 6/10        Manuals 7/1 7/3 7/8 7/10   6/17 6/19 6/24 6/26   Elbow flex/ext/sup/pro PROM       KT CG KT CG   STM for edema  CG KT KT   KT CG KT CG   Scar mobs KT CG KT KT   KT CG KT  CG                Neuro Re-Ed             Pt education        3' 3' 3'    Ulnar NG x10 x10 x10 x10   x10 x10 x10 x10    squeeze Green 2x10 5\" Green 2x10 5\" Green 2x10 5\" Green 2x10 5\"      Red 2x10 5\"   Bicep curl 1# 2x10 1# 2x10 1# " "2x10 2# 2x10         Tricep ext Grn 2x10 Grn 2x10 Grn 2x10 Grn 2x10                                   Ther Ex             Elbow flex/ext PROM 2x10 HEP     2x10 2x10 2x10 2x10   Elbow pro/sup PROM 2x10 HEP     2x10 2x10 2x10 2x10   Elbow flex/ext AROM 2x10 2x10 2x10 2x10   2x10 2x10 2x10 2x10   Elbow pro/sup AROM 2x10 2x10 2x10 1# 2x10 1#   2x10 2x10 2x10 2x10   Wrist RD AROM 2x10 1# 2x10 1# 2x10 1# 2x10 1#   2x10 2x10 2x10 1# 2x10 1#   Wrist ext AROM 2# 2x10 2# 2x10 2# 2x10 2# 2x10   1# 2x10 1# 2x10 2# 2x10 2# 2x10   Wrist flex AROM 2# 2x10 2# 2x10 2# 2x10 2# 2x10   1# 2x10 1# 2x10 2# 2x10 2# 2x10   Wrist flex/ext str 10x 10\" ea 10x 10\" ea 10x 10\" ea 10x 10\" ea   10x 10\" ea 10x 10\" ea 10x10\" ea 10x 10\" ea   UBE for ROM 4' 4' 4' 4'   4' 4' 4' 4'   Ther Activity                                       Gait Training                                       Modalities                                                                      "

## 2024-07-17 ENCOUNTER — APPOINTMENT (OUTPATIENT)
Dept: PHYSICAL THERAPY | Facility: CLINIC | Age: 49
End: 2024-07-17
Payer: OTHER MISCELLANEOUS

## 2024-07-22 ENCOUNTER — EVALUATION (OUTPATIENT)
Dept: PHYSICAL THERAPY | Facility: CLINIC | Age: 49
End: 2024-07-22
Payer: OTHER MISCELLANEOUS

## 2024-07-22 DIAGNOSIS — G56.22 ENTRAPMENT OF LEFT ULNAR NERVE: ICD-10-CM

## 2024-07-22 DIAGNOSIS — Z98.890 S/P DECOMPRESSION OF ULNAR NERVE: Primary | ICD-10-CM

## 2024-07-22 PROCEDURE — 97140 MANUAL THERAPY 1/> REGIONS: CPT

## 2024-07-22 PROCEDURE — 97110 THERAPEUTIC EXERCISES: CPT

## 2024-07-22 PROCEDURE — 97112 NEUROMUSCULAR REEDUCATION: CPT

## 2024-07-22 NOTE — LETTER
2024    Mark David Lazarus, MD  [address]  [address]  [facility] [address]    Patient: Mike Yoo   YOB: 1975   Date of Visit: 2024     Encounter Diagnosis     ICD-10-CM    1. S/P decompression of ulnar nerve  Z98.890       2. Entrapment of left ulnar nerve  G56.22           Dear Dr. Lazarus:    Thank you for your recent referral of Mike Yoo. Please review the attached evaluation summary from Mike's recent visit.     Please verify that you agree with the plan of care by signing the attached order.     If you have any questions or concerns, please do not hesitate to call.     I sincerely appreciate the opportunity to share in the care of one of your patients and hope to have another opportunity to work with you in the near future.       Sincerely,    Lala Farfan, PT      Referring Provider:      I certify that I have read the below Plan of Care and certify the need for these services furnished under this plan of treatment while under my care.                    Mark David Lazarus, MD  [address]  [address]  [facility] [address]  Via Fax: [phone]          PT Re-Evaluation     Today's date: 2024  Patient name: Mike Yoo  : 1975  MRN: [id number]  Referring provider: Lazarus, Mark David, MD  Dx:   Encounter Diagnosis     ICD-10-CM    1. S/P decompression of ulnar nerve  Z98.890       2. Entrapment of left ulnar nerve  G56.22             Start Time: 1015  Stop Time: 1102  Total time in clinic (min): 47 minutes    Assessment  Impairments: abnormal or restricted ROM, activity intolerance, impaired physical strength, lacks appropriate home exercise program and pain with function  Symptom irritability: moderate    Assessment details: Pt is a 48 y/o male who has been seen in outpatient PT s/p left eblow LCL reconstruction and ulnar nerve decompression on . Upon reassessment, pt presents with improved elbow ROM and strength as well as a reduction in  painful symptoms. Functionally, these improvements have led to increased tolerance for lifting, carrying, and performance of his usual ADLs and household tasks. Surgeon has lifted weight restrictions and has instructed to continue progressing within pt tolerance. Pt will continue to benefit from skilled PT to address remaining limitations in order to improve activity tolerance for household and work activities. Plan to progress program to include functional strengthening exercises within tolerance.     Understanding of Dx/Px/POC: good     Prognosis: good    Goals  STG - 4 weeks  1. L elbow AROM improved to WNL compared to right with minimal to no pain. - 80%  2.  strength improved to at least 50 lbs with minimal to no pain. - 50%    LTG - 8 weeks  1. FOTO score will improve from 21 to 48 to demonstrate improved functional abilities. 85%  2. Pt able to lift at least 5 lbs with L hand with minimal difficulty - IN PROGRESS    Plan  Patient would benefit from: skilled physical therapy and PT eval  Planned modality interventions: cryotherapy    Planned therapy interventions: IASTM, joint mobilization, kinesiology taping, manual therapy, nerve gliding, neuromuscular re-education, patient/caregiver education, strengthening, stretching, therapeutic activities, therapeutic exercise, functional ROM exercises, graded exercise and home exercise program    Frequency: 2x week  Plan of Care beginning date: 7/22/2024  Plan of Care expiration date: 9/16/2024  Treatment plan discussed with: patient        Subjective Evaluation    History of Present Illness  Date of onset: 9/27/2022  Date of surgery: 4/9/2024  Mechanism of injury: surgery  Mechanism of injury: Pt had a fall at work on 9/27/22 and injured his left elbow. Due to issues with worker's comp, he did not get surgery until 4/9/24. Pt works for [employer] and has been out of work since the initial injury. He states he has lifting requirements of  lbs. Pt reports stiffness  and sharp pain on the inside of his elbow. Had previous physical therapy at a different location.      - Pt reports improvements in elbow mobility and strength as well as a reduction in painful symptoms. Surgeon was pleased with his progress and lifted restrictions. He is motivated to continue with skilled PT.  Quality of life: good    Patient Goals  Patient goals for therapy: decreased pain, independence with ADLs/IADLs, increased strength, increased motion and return to work    Pain  Current pain ratin  At best pain ratin  At worst pain ratin  Quality: dull ache, radiating and sharp  Relieving factors: ice and medications  Aggravating factors: lifting    Hand dominance: right    Treatments  Previous treatment: physical therapy        Objective     Observations     Additional Observation Details  Surgical site well healed, mild edema on medial elbow    Palpation     Additional Palpation Details  TTP along medial surgical site, flexor insertion    Active Range of Motion     Left Elbow   Flexion: 116 degrees   Extension: -4 degrees   Forearm supination: 75 degrees   Forearm pronation: 75 degrees     Right Elbow   Normal active range of motion    Additional Active Range of Motion Details  Mild discomfort at end ROM    Strength/Myotome Testing     Left Elbow   Flexion: 4+  Extension: 4+  Forearm supination: 4  Forearm pronation: 4    Right Elbow   Flexion: 5  Extension: 5    Additional Strength Details   strength - R 95 lbs, L 35 lbs               Precautions: s/p L elbow LCL reconstruction, debridement, ulnar nerve decompression 24    POC expires Unit limit Auth Expiration date PT/OT + Visit Limit?    N/A  BOMN                 Visit/Unit Tracking  AUTH Status:  Date 7/10 7/15 7/22 - FOTO            BOMN Used 13 14 15             Remaining                  FOTO - done         Manuals 7/1 7/3 7/8 7/10 7/22  6/17 6/19 6/24 6/26   Elbow flex/ext/sup/pro PROM       KT CG KT CG   STM for  "edema  CG KT KT CG  KT CG KT CG   Scar mobs KT CG KT KT CG  KT CG KT  CG                Neuro Re-Ed             Pt education        3' 3' 3'    Ulnar NG x10 x10 x10 x10 x10  x10 x10 x10 x10    squeeze Green 2x10 5\" Green 2x10 5\" Green 2x10 5\" Green 2x10 5\" Grn 2x10 5\"     Red 2x10 5\"   Bicep curl 1# 2x10 1# 2x10 1# 2x10 2# 2x10 2# 2x10        Tricep ext Grn 2x10 Grn 2x10 Grn 2x10 Grn 2x10 Grn 2x10        Wall shoulder taps     nv                     Ther Ex             Elbow flex/ext PROM 2x10 HEP     2x10 2x10 2x10 2x10   Elbow pro/sup PROM 2x10 HEP     2x10 2x10 2x10 2x10   Elbow flex/ext AROM 2x10 2x10 2x10 2x10 2x10  2x10 2x10 2x10 2x10   Elbow pro/sup AROM 2x10 2x10 2x10 1# 2x10 1# 2x10 1#  2x10 2x10 2x10 2x10   Wrist RD AROM 2x10 1# 2x10 1# 2x10 1# 2x10 1# 2x10 1#  2x10 2x10 2x10 1# 2x10 1#   Wrist ext AROM 2# 2x10 2# 2x10 2# 2x10 2# 2x10 2# 2x10  1# 2x10 1# 2x10 2# 2x10 2# 2x10   Wrist flex AROM 2# 2x10 2# 2x10 2# 2x10 2# 2x10 2# 2x10  1# 2x10 1# 2x10 2# 2x10 2# 2x10   Wrist flex/ext str 10x 10\" ea 10x 10\" ea 10x 10\" ea 10x 10\" ea 10x 10\" ea  10x 10\" ea 10x 10\" ea 10x10\" ea 10x 10\" ea   UBE for ROM 4' 4' 4' 4' 4'  4' 4' 4' 4'   Ther Activity             Lara's carry     nv        SA row     nv        Gait Training                                       Modalities                                                            "

## 2024-07-22 NOTE — PROGRESS NOTES
PT Re-Evaluation     Today's date: 2024  Patient name: Mike Yoo  : 1975  MRN: [id number]  Referring provider: Lazarus, Mark David, MD  Dx:   Encounter Diagnosis     ICD-10-CM    1. S/P decompression of ulnar nerve  Z98.890       2. Entrapment of left ulnar nerve  G56.22             Start Time: 1015  Stop Time: 1102  Total time in clinic (min): 47 minutes    Assessment  Impairments: abnormal or restricted ROM, activity intolerance, impaired physical strength, lacks appropriate home exercise program and pain with function  Symptom irritability: moderate    Assessment details: Pt is a 50 y/o male who has been seen in outpatient PT s/p left eblow LCL reconstruction and ulnar nerve decompression on . Upon reassessment, pt presents with improved elbow ROM and strength as well as a reduction in painful symptoms. Functionally, these improvements have led to increased tolerance for lifting, carrying, and performance of his usual ADLs and household tasks. Surgeon has lifted weight restrictions and has instructed to continue progressing within pt tolerance. Pt will continue to benefit from skilled PT to address remaining limitations in order to improve activity tolerance for household and work activities. Plan to progress program to include functional strengthening exercises within tolerance.     Understanding of Dx/Px/POC: good     Prognosis: good    Goals  STG - 4 weeks  1. L elbow AROM improved to WNL compared to right with minimal to no pain. - 80%  2.  strength improved to at least 50 lbs with minimal to no pain. - 50%    LTG - 8 weeks  1. FOTO score will improve from 21 to 48 to demonstrate improved functional abilities. 85%  2. Pt able to lift at least 5 lbs with L hand with minimal difficulty - IN PROGRESS    Plan  Patient would benefit from: skilled physical therapy and PT eval  Planned modality interventions: cryotherapy    Planned therapy interventions: IASTM, joint mobilization, kinesiology  taping, manual therapy, nerve gliding, neuromuscular re-education, patient/caregiver education, strengthening, stretching, therapeutic activities, therapeutic exercise, functional ROM exercises, graded exercise and home exercise program    Frequency: 2x week  Plan of Care beginning date: 2024  Plan of Care expiration date: 2024  Treatment plan discussed with: patient        Subjective Evaluation    History of Present Illness  Date of onset: 2022  Date of surgery: 2024  Mechanism of injury: surgery  Mechanism of injury: Pt had a fall at work on 22 and injured his left elbow. Due to issues with worker's comp, he did not get surgery until 24. Pt works for [employer] and has been out of work since the initial injury. He states he has lifting requirements of  lbs. Pt reports stiffness and sharp pain on the inside of his elbow. Had previous physical therapy at a different location.      - Pt reports improvements in elbow mobility and strength as well as a reduction in painful symptoms. Surgeon was pleased with his progress and lifted restrictions. He is motivated to continue with skilled PT.  Quality of life: good    Patient Goals  Patient goals for therapy: decreased pain, independence with ADLs/IADLs, increased strength, increased motion and return to work    Pain  Current pain ratin  At best pain ratin  At worst pain ratin  Quality: dull ache, radiating and sharp  Relieving factors: ice and medications  Aggravating factors: lifting    Hand dominance: right    Treatments  Previous treatment: physical therapy        Objective     Observations     Additional Observation Details  Surgical site well healed, mild edema on medial elbow    Palpation     Additional Palpation Details  TTP along medial surgical site, flexor insertion    Active Range of Motion     Left Elbow   Flexion: 116 degrees   Extension: -4 degrees   Forearm supination: 75 degrees   Forearm pronation: 75 degrees  "    Right Elbow   Normal active range of motion    Additional Active Range of Motion Details  Mild discomfort at end ROM    Strength/Myotome Testing     Left Elbow   Flexion: 4+  Extension: 4+  Forearm supination: 4  Forearm pronation: 4    Right Elbow   Flexion: 5  Extension: 5    Additional Strength Details   strength - R 95 lbs, L 35 lbs               Precautions: s/p L elbow LCL reconstruction, debridement, ulnar nerve decompression 4/9/24    POC expires Unit limit Auth Expiration date PT/OT + Visit Limit?   9/16 N/A 12/31 BOMN                 Visit/Unit Tracking  AUTH Status:  Date 7/10 7/15 7/22 - FOTO            BOMN Used 13 14 15             Remaining                  FOTO - done 7/22        Manuals 7/1 7/3 7/8 7/10 7/22  6/17 6/19 6/24 6/26   Elbow flex/ext/sup/pro PROM       KT CG KT CG   STM for edema  CG KT KT CG  KT CG KT CG   Scar mobs KT CG KT KT CG  KT CG KT  CG                Neuro Re-Ed             Pt education        3' 3' 3'    Ulnar NG x10 x10 x10 x10 x10  x10 x10 x10 x10    squeeze Green 2x10 5\" Green 2x10 5\" Green 2x10 5\" Green 2x10 5\" Grn 2x10 5\"     Red 2x10 5\"   Bicep curl 1# 2x10 1# 2x10 1# 2x10 2# 2x10 2# 2x10        Tricep ext Grn 2x10 Grn 2x10 Grn 2x10 Grn 2x10 Grn 2x10        Wall shoulder taps     nv                     Ther Ex             Elbow flex/ext PROM 2x10 HEP     2x10 2x10 2x10 2x10   Elbow pro/sup PROM 2x10 HEP     2x10 2x10 2x10 2x10   Elbow flex/ext AROM 2x10 2x10 2x10 2x10 2x10  2x10 2x10 2x10 2x10   Elbow pro/sup AROM 2x10 2x10 2x10 1# 2x10 1# 2x10 1#  2x10 2x10 2x10 2x10   Wrist RD AROM 2x10 1# 2x10 1# 2x10 1# 2x10 1# 2x10 1#  2x10 2x10 2x10 1# 2x10 1#   Wrist ext AROM 2# 2x10 2# 2x10 2# 2x10 2# 2x10 2# 2x10  1# 2x10 1# 2x10 2# 2x10 2# 2x10   Wrist flex AROM 2# 2x10 2# 2x10 2# 2x10 2# 2x10 2# 2x10  1# 2x10 1# 2x10 2# 2x10 2# 2x10   Wrist flex/ext str 10x 10\" ea 10x 10\" ea 10x 10\" ea 10x 10\" ea 10x 10\" ea  10x 10\" ea 10x 10\" ea 10x10\" ea 10x 10\" ea   UBE for ROM " 4' 4' 4' 4' 4'  4' 4' 4' 4'   Ther Activity             Lara's carry     nv        SA row     nv        Gait Training                                       Modalities

## 2024-07-24 ENCOUNTER — OFFICE VISIT (OUTPATIENT)
Dept: PHYSICAL THERAPY | Facility: CLINIC | Age: 49
End: 2024-07-24
Payer: OTHER MISCELLANEOUS

## 2024-07-24 DIAGNOSIS — G56.22 ENTRAPMENT OF LEFT ULNAR NERVE: ICD-10-CM

## 2024-07-24 DIAGNOSIS — Z98.890 S/P DECOMPRESSION OF ULNAR NERVE: Primary | ICD-10-CM

## 2024-07-24 PROCEDURE — 97110 THERAPEUTIC EXERCISES: CPT

## 2024-07-24 PROCEDURE — 97112 NEUROMUSCULAR REEDUCATION: CPT

## 2024-07-24 PROCEDURE — 97530 THERAPEUTIC ACTIVITIES: CPT

## 2024-07-24 NOTE — PROGRESS NOTES
"Daily Note     Today's date: 2024  Patient name: Mike Yoo  : 1975  MRN: [id number]  Referring provider: Lazarus, Mark David, MD  Dx:   Encounter Diagnosis     ICD-10-CM    1. S/P decompression of ulnar nerve  Z98.890       2. Entrapment of left ulnar nerve  G56.22           Start Time: 1015  Stop Time: 1102  Total time in clinic (min): 47 minutes    Subjective: Pt reports his usual soreness today, no worse than usual.      Objective: See treatment diary below      Assessment: Tolerated treatment well. Patient demonstrated fatigue post treatment, exhibited good technique with therapeutic exercises, and would benefit from continued PT. Progressed program this session to add farmer's carry and SA row for further functional strengthening. Pt was challenged by program but noted only muscle soreness following session. Will assess      Plan: Progress treatment as tolerated.       Precautions: s/p L elbow LCL reconstruction, debridement, ulnar nerve decompression 24    POC expires Unit limit Auth Expiration date PT/OT + Visit Limit?    N/A  BOMN                 Visit/Unit Tracking  AUTH Status:  Date 7/10 7/15 7/22 - FOTO            BOMN Used 13 14 15 16            Remaining                  FOTO - done         Manuals 7/1 7/3 7/8 7/10 7/22 7/24       Elbow flex/ext/sup/pro PROM             STM for edema  CG KT KT CG        Scar mobs KT CG KT KT CG                     Neuro Re-Ed             Pt education              Ulnar NG x10 x10 x10 x10 x10         squeeze Green 2x10 5\" Green 2x10 5\" Green 2x10 5\" Green 2x10 5\" Grn 2x10 5\" Grn 2x10       Bicep curl 1# 2x10 1# 2x10 1# 2x10 2# 2x10 2# 2x10 2# 2x10       Tricep ext Grn 2x10 Grn 2x10 Grn 2x10 Grn 2x10 Grn 2x10 Grn 2x10       Wall shoulder taps     nv nv                    Ther Ex             Elbow flex/ext PROM 2x10 HEP           Elbow pro/sup PROM 2x10 HEP           Elbow flex/ext AROM 2x10 2x10 2x10 2x10 2x10 2x10       Elbow " "pro/sup AROM 2x10 2x10 2x10 1# 2x10 1# 2x10 1# 2x10 1#       Wrist RD AROM 2x10 1# 2x10 1# 2x10 1# 2x10 1# 2x10 1# 2x10 1#       Wrist ext AROM 2# 2x10 2# 2x10 2# 2x10 2# 2x10 2# 2x10 2# 2x10       Wrist flex AROM 2# 2x10 2# 2x10 2# 2x10 2# 2x10 2# 2x10 2# 2x10       Wrist flex/ext str 10x 10\" ea 10x 10\" ea 10x 10\" ea 10x 10\" ea 10x 10\" ea 10x 10\" ea       UBE for ROM 4' 4' 4' 4' 4' 4'       Ther Activity             Farmer's carry     nv 5# 2 laps       SA row TB     nv Blk 2x10       Gait Training                                       Modalities                                            "

## 2024-07-29 ENCOUNTER — OFFICE VISIT (OUTPATIENT)
Dept: PHYSICAL THERAPY | Facility: CLINIC | Age: 49
End: 2024-07-29
Payer: OTHER MISCELLANEOUS

## 2024-07-29 DIAGNOSIS — Z98.890 S/P DECOMPRESSION OF ULNAR NERVE: Primary | ICD-10-CM

## 2024-07-29 DIAGNOSIS — G56.22 ENTRAPMENT OF LEFT ULNAR NERVE: ICD-10-CM

## 2024-07-29 PROCEDURE — 97530 THERAPEUTIC ACTIVITIES: CPT

## 2024-07-29 PROCEDURE — 97112 NEUROMUSCULAR REEDUCATION: CPT

## 2024-07-29 PROCEDURE — 97110 THERAPEUTIC EXERCISES: CPT

## 2024-07-29 NOTE — PROGRESS NOTES
"Daily Note     Today's date: 2024  Patient name: Mike Yoo  : 1975  MRN: [id number]  Referring provider: Lazarus, Mark David, MD  Dx:   Encounter Diagnosis     ICD-10-CM    1. S/P decompression of ulnar nerve  Z98.890       2. Entrapment of left ulnar nerve  G56.22           Start Time: 1014  Stop Time: 1059  Total time in clinic (min): 45 minutes    Subjective: Pt reports today the inside of his elbow is sore, but yesterday it was the outside.       Objective: See treatment diary below      Assessment: Tolerated treatment well. Pt demonstrates good effort throughout session. Minimal curing given to facilitate proper exercise performance and technique. He tolerates increased weight for sup/pron and added an extra lap for suitcase carry today. Continue to progress pt as tolerated. Patient demonstrated fatigue post treatment, exhibited good technique with therapeutic exercises, and would benefit from continued PT      Plan: Continue per plan of care.      Precautions: s/p L elbow LCL reconstruction, debridement, ulnar nerve decompression 24    POC expires Unit limit Auth Expiration date PT/OT + Visit Limit?    N/A  BOMN                 Visit/Unit Tracking  AUTH Status:  Date 7/10 7/15 7/22 - FOTO           BOMN Used 13 14 15 16 17           Remaining                  FOTO - done         Manuals 7/1 7/3 7/8 7/10 7/22 7/24 7/29      Elbow flex/ext/sup/pro PROM             STM for edema  CG KT KT CG        Scar mobs KT CG KT KT CG                     Neuro Re-Ed             Pt education              Ulnar NG x10 x10 x10 x10 x10         squeeze Green 2x10 5\" Green 2x10 5\" Green 2x10 5\" Green 2x10 5\" Grn 2x10 5\" Grn 2x10 Blue 2x10      Bicep curl 1# 2x10 1# 2x10 1# 2x10 2# 2x10 2# 2x10 2# 2x10 2# 2x10      Tricep ext Grn 2x10 Grn 2x10 Grn 2x10 Grn 2x10 Grn 2x10 Grn 2x10 Grn 2x10      Wall shoulder taps     nv nv 1x10 ea                   Ther Ex             Elbow flex/ext PROM " "2x10 HEP           Elbow pro/sup PROM 2x10 HEP           Elbow flex/ext AROM 2x10 2x10 2x10 2x10 2x10 2x10 2x10      Elbow pro/sup AROM 2x10 2x10 2x10 1# 2x10 1# 2x10 1# 2x10 1# 2x10 2#      Wrist RD AROM 2x10 1# 2x10 1# 2x10 1# 2x10 1# 2x10 1# 2x10 1# 2x10 1#      Wrist ext AROM 2# 2x10 2# 2x10 2# 2x10 2# 2x10 2# 2x10 2# 2x10 2# 2x10      Wrist flex AROM 2# 2x10 2# 2x10 2# 2x10 2# 2x10 2# 2x10 2# 2x10 2# 2x10      Wrist flex/ext str 10x 10\" ea 10x 10\" ea 10x 10\" ea 10x 10\" ea 10x 10\" ea 10x 10\" ea 10x 10\" ea      UBE for ROM 4' 4' 4' 4' 4' 4' 4'      Ther Activity             Farmer's carry     nv 5# 2 laps 5# 3 laps      SA row TB     nv Blk 2x10 Blk 2x10      Gait Training                                       Modalities                                              "

## 2024-07-31 ENCOUNTER — OFFICE VISIT (OUTPATIENT)
Dept: PHYSICAL THERAPY | Facility: CLINIC | Age: 49
End: 2024-07-31
Payer: OTHER MISCELLANEOUS

## 2024-07-31 DIAGNOSIS — G56.22 ENTRAPMENT OF LEFT ULNAR NERVE: ICD-10-CM

## 2024-07-31 DIAGNOSIS — Z98.890 S/P DECOMPRESSION OF ULNAR NERVE: Primary | ICD-10-CM

## 2024-07-31 PROCEDURE — 97530 THERAPEUTIC ACTIVITIES: CPT

## 2024-07-31 PROCEDURE — 97110 THERAPEUTIC EXERCISES: CPT

## 2024-07-31 PROCEDURE — 97112 NEUROMUSCULAR REEDUCATION: CPT

## 2024-07-31 NOTE — PROGRESS NOTES
"Daily Note     Today's date: 2024  Patient name: Mike Yoo  : 1975  MRN: [id number]  Referring provider: Lazarus, Mark David, MD  Dx:   Encounter Diagnosis     ICD-10-CM    1. S/P decompression of ulnar nerve  Z98.890       2. Entrapment of left ulnar nerve  G56.22                      Subjective: Pt reports his hand was numb when he woke up today. Its improving as the day goes on but his pinky is still numb.       Objective: See treatment diary below      Assessment: Tolerated treatment well. Pt demonstrates good effort throughout session. Minimal curing given to facilitate proper exercise performance and technique. Added a WB wrist extension stretch this visit. He reports some pain with end range wrist extension when doing wall shoulder taps, which was better tolerated when he made fists. Patient demonstrated fatigue post treatment, exhibited good technique with therapeutic exercises, and would benefit from continued PT      Plan: Continue per plan of care.      Precautions: s/p L elbow LCL reconstruction, debridement, ulnar nerve decompression 24    POC expires Unit limit Auth Expiration date PT/OT + Visit Limit?    N/A  BOMN                 Visit/Unit Tracking  AUTH Status:  Date 7/10 7/15 7/22 - FOTO          BOMN Used 13 14 15 16 17 18          Remaining                  FOTO - done         Manuals 7/1 7/3 7/8 7/10 7/22 7/24 7/29 7/31     Elbow flex/ext/sup/pro PROM             STM for edema  CG KT KT CG        Scar mobs KT CG KT KT CG                     Neuro Re-Ed             Pt education              Ulnar NG x10 x10 x10 x10 x10         squeeze Green 2x10 5\" Green 2x10 5\" Green 2x10 5\" Green 2x10 5\" Grn 2x10 5\" Grn 2x10 Blue 2x10 Blue 2x10     Bicep curl 1# 2x10 1# 2x10 1# 2x10 2# 2x10 2# 2x10 2# 2x10 2# 2x10 2# 2x10     Tricep ext Grn 2x10 Grn 2x10 Grn 2x10 Grn 2x10 Grn 2x10 Grn 2x10 Grn 2x10 Grn 2x10     Wall shoulder taps     nv nv 1x10 ea 1x10 ea     WB " "wrist extension stretch        10\"x5     Ther Ex             Elbow flex/ext PROM 2x10 HEP           Elbow pro/sup PROM 2x10 HEP           Elbow flex/ext AROM 2x10 2x10 2x10 2x10 2x10 2x10 2x10 2x10     Elbow pro/sup AROM 2x10 2x10 2x10 1# 2x10 1# 2x10 1# 2x10 1# 2x10 2# 2x10 2#     Wrist RD AROM 2x10 1# 2x10 1# 2x10 1# 2x10 1# 2x10 1# 2x10 1# 2x10 1# 2x10 1#     Wrist ext AROM 2# 2x10 2# 2x10 2# 2x10 2# 2x10 2# 2x10 2# 2x10 2# 2x10 2# 2x10     Wrist flex AROM 2# 2x10 2# 2x10 2# 2x10 2# 2x10 2# 2x10 2# 2x10 2# 2x10 2# 2x10     Wrist flex/ext str 10x 10\" ea 10x 10\" ea 10x 10\" ea 10x 10\" ea 10x 10\" ea 10x 10\" ea 10x 10\" ea 10x 10\" ea     UBE for ROM 4' 4' 4' 4' 4' 4' 4' 4'     Ther Activity             Farmer's carry     nv 5# 2 laps 5# 3 laps 5# 3 laps     SA row TB     nv Blk 2x10 Blk 2x10 Blk 2x10     Gait Training                                       Modalities                                                "

## 2024-08-05 ENCOUNTER — OFFICE VISIT (OUTPATIENT)
Dept: PHYSICAL THERAPY | Facility: CLINIC | Age: 49
End: 2024-08-05
Payer: OTHER MISCELLANEOUS

## 2024-08-05 DIAGNOSIS — Z98.890 S/P DECOMPRESSION OF ULNAR NERVE: Primary | ICD-10-CM

## 2024-08-05 DIAGNOSIS — G56.22 ENTRAPMENT OF LEFT ULNAR NERVE: ICD-10-CM

## 2024-08-05 PROCEDURE — 97530 THERAPEUTIC ACTIVITIES: CPT

## 2024-08-05 PROCEDURE — 97112 NEUROMUSCULAR REEDUCATION: CPT

## 2024-08-05 PROCEDURE — 97110 THERAPEUTIC EXERCISES: CPT

## 2024-08-05 NOTE — PROGRESS NOTES
"Daily Note     Today's date: 2024  Patient name: Mike Yoo  : 1975  MRN: [id number]  Referring provider: Lazarus, Mark David, MD  Dx:   Encounter Diagnosis     ICD-10-CM    1. S/P decompression of ulnar nerve  Z98.890       2. Entrapment of left ulnar nerve  G56.22           Start Time: 1015  Stop Time: 1100  Total time in clinic (min): 45 minutes    Subjective: Pt reports his usual elbow soreness today but overall feels like he's getting better.      Objective: See treatment diary below      Assessment: Tolerated treatment well. Patient demonstrated fatigue post treatment, exhibited good technique with therapeutic exercises, and would benefit from continued PT. Pt continues to progress well through program with improved tolerance to activity as evidenced by muscle soreness at end of session. He continues to be most challenged with WB through affected UE but is improving.       Plan: Progress treatment as tolerated.       Precautions: s/p L elbow LCL reconstruction, debridement, ulnar nerve decompression 24    POC expires Unit limit Auth Expiration date PT/OT + Visit Limit?    N/A  BOMN                 Visit/Unit Tracking  AUTH Status:  Date 7/10 7/15 7/22 - FOTO          BOMN Used 13 14 15 16 17 18          Remaining                  FOTO - done         Manuals 7/1 7/3 7/8 7/10 7/22 7/24 7/29 7/31 8/    Elbow flex/ext/sup/pro PROM             STM for edema  CG KT KT CG        Scar mobs KT CG KT KT CG                     Neuro Re-Ed             Pt education              Ulnar NG x10 x10 x10 x10 x10         squeeze Green 2x10 5\" Green 2x10 5\" Green 2x10 5\" Green 2x10 5\" Grn 2x10 5\" Grn 2x10 Blue 2x10 Blue 2x10 Blue 2x10    Bicep curl 1# 2x10 1# 2x10 1# 2x10 2# 2x10 2# 2x10 2# 2x10 2# 2x10 2# 2x10 2# 2x10    Tricep ext Grn 2x10 Grn 2x10 Grn 2x10 Grn 2x10 Grn 2x10 Grn 2x10 Grn 2x10 Grn 2x10 Blue 2x10    Wall shoulder taps     nv nv 1x10 ea 1x10 ea 1x10 ea    WB wrist " "extension stretch        10\"x5 10\"x5    Ther Ex             Elbow flex/ext PROM 2x10 HEP           Elbow pro/sup PROM 2x10 HEP           Elbow flex/ext AROM 2x10 2x10 2x10 2x10 2x10 2x10 2x10 2x10 2x10 HEP   Elbow pro/sup AROM 2x10 2x10 2x10 1# 2x10 1# 2x10 1# 2x10 1# 2x10 2# 2x10 2# 2x10 2#    Wrist RD AROM 2x10 1# 2x10 1# 2x10 1# 2x10 1# 2x10 1# 2x10 1# 2x10 1# 2x10 1# 2x10 1#    Wrist ext AROM 2# 2x10 2# 2x10 2# 2x10 2# 2x10 2# 2x10 2# 2x10 2# 2x10 2# 2x10 2# 2x10    Wrist flex AROM 2# 2x10 2# 2x10 2# 2x10 2# 2x10 2# 2x10 2# 2x10 2# 2x10 2# 2x10 2# 2x10    Wrist flex/ext str 10x 10\" ea 10x 10\" ea 10x 10\" ea 10x 10\" ea 10x 10\" ea 10x 10\" ea 10x 10\" ea 10x 10\" ea 10x 10\" ea    UBE for ROM 4' 4' 4' 4' 4' 4' 4' 4' 4'    Ther Activity             Farmer's carry     nv 5# 2 laps 5# 3 laps 5# 3 laps 5# 3 laps    SA row TB     nv Blk 2x10 Blk 2x10 Blk 2x10 Blk 2x10    Gait Training                                       Modalities                                                  "

## 2024-08-07 ENCOUNTER — OFFICE VISIT (OUTPATIENT)
Dept: PHYSICAL THERAPY | Facility: CLINIC | Age: 49
End: 2024-08-07
Payer: OTHER MISCELLANEOUS

## 2024-08-07 DIAGNOSIS — Z98.890 S/P DECOMPRESSION OF ULNAR NERVE: Primary | ICD-10-CM

## 2024-08-07 DIAGNOSIS — G56.22 ENTRAPMENT OF LEFT ULNAR NERVE: ICD-10-CM

## 2024-08-07 PROCEDURE — 97112 NEUROMUSCULAR REEDUCATION: CPT

## 2024-08-07 PROCEDURE — 97110 THERAPEUTIC EXERCISES: CPT

## 2024-08-07 PROCEDURE — 97530 THERAPEUTIC ACTIVITIES: CPT

## 2024-08-07 NOTE — PROGRESS NOTES
"Daily Note     Today's date: 2024  Patient name: Mike Yoo  : 1975  MRN: [id number]  Referring provider: Lazarus, Mark David, MD  Dx:   Encounter Diagnosis     ICD-10-CM    1. S/P decompression of ulnar nerve  Z98.890       2. Entrapment of left ulnar nerve  G56.22                      Subjective: Pt reports his usual elbow pain today present 6/10. Notes that he has been doing his HEP.       Objective: See treatment diary below      Assessment: Tolerated treatment well. Patient demonstrated fatigue post treatment, exhibited good technique with therapeutic exercises, and would benefit from continued PT. Pt continues to progress well through program with improved tolerance to activity as evidenced by muscle soreness at end of session. Noted some popping in his L elbow at the end of session. Re educated pt the importance of his self stretches at home. He continues to be most challenged with WB through affected UE but is improving.       Plan: Progress treatment as tolerated.       Precautions: s/p L elbow LCL reconstruction, debridement, ulnar nerve decompression 24    POC expires Unit limit Auth Expiration date PT/OT + Visit Limit?    N/A  BOMN                 Visit/Unit Tracking  AUTH Status:  Date 7/10 7/15 7/22 - FOTO  foto        BOMN Used 13 14 15 16 17 18 19         Remaining                  FOTO - done         Manuals 7/1 7/3 7/8 7/10 7/22 7/24 7/29 7/31 8   Elbow flex/ext/sup/pro PROM             STM for edema  CG KT KT CG        Scar mobs KT CG KT KT CG                     Neuro Re-Ed             Pt education              Ulnar NG x10 x10 x10 x10 x10         squeeze Green 2x10 5\" Green 2x10 5\" Green 2x10 5\" Green 2x10 5\" Grn 2x10 5\" Grn 2x10 Blue 2x10 Blue 2x10 Blue 2x10 Blue 2x10   Bicep curl 1# 2x10 1# 2x10 1# 2x10 2# 2x10 2# 2x10 2# 2x10 2# 2x10 2# 2x10 2# 2x10 2# 2x10   Tricep ext Grn 2x10 Grn 2x10 Grn 2x10 Grn 2x10 Grn 2x10 Grn 2x10 Grn 2x10 Grn " "2x10 Blue 2x10 Blue 2x10   Wall shoulder taps     nv nv 1x10 ea 1x10 ea 1x10 ea 1x10 ea   WB wrist extension stretch        10\"x5 10\"x5 10\"x5   Ther Ex             Elbow flex/ext PROM 2x10 HEP           Elbow pro/sup PROM 2x10 HEP           Elbow flex/ext AROM 2x10 2x10 2x10 2x10 2x10 2x10 2x10 2x10 2x10 HEP   Elbow pro/sup AROM 2x10 2x10 2x10 1# 2x10 1# 2x10 1# 2x10 1# 2x10 2# 2x10 2# 2x10 2# 2x10 2#   Wrist RD AROM 2x10 1# 2x10 1# 2x10 1# 2x10 1# 2x10 1# 2x10 1# 2x10 1# 2x10 1# 2x10 1# 2x10 1#   Wrist ext AROM 2# 2x10 2# 2x10 2# 2x10 2# 2x10 2# 2x10 2# 2x10 2# 2x10 2# 2x10 2# 2x10 2# 2x10   Wrist flex AROM 2# 2x10 2# 2x10 2# 2x10 2# 2x10 2# 2x10 2# 2x10 2# 2x10 2# 2x10 2# 2x10 2# 2x10   Wrist flex/ext str 10x 10\" ea 10x 10\" ea 10x 10\" ea 10x 10\" ea 10x 10\" ea 10x 10\" ea 10x 10\" ea 10x 10\" ea 10x 10\" ea 10x10\" ea   UBE for ROM 4' 4' 4' 4' 4' 4' 4' 4' 4' 4'   Ther Activity             Farmer's carry     nv 5# 2 laps 5# 3 laps 5# 3 laps 5# 3 laps 5# 3 laps   SA row TB     nv Blk 2x10 Blk 2x10 Blk 2x10 Blk 2x10 Blk 2x10   Gait Training                                       Modalities                                                pic  "

## 2024-08-12 ENCOUNTER — OFFICE VISIT (OUTPATIENT)
Dept: PHYSICAL THERAPY | Facility: CLINIC | Age: 49
End: 2024-08-12
Payer: OTHER MISCELLANEOUS

## 2024-08-12 DIAGNOSIS — G56.22 ENTRAPMENT OF LEFT ULNAR NERVE: ICD-10-CM

## 2024-08-12 DIAGNOSIS — Z98.890 S/P DECOMPRESSION OF ULNAR NERVE: Primary | ICD-10-CM

## 2024-08-12 PROCEDURE — 97530 THERAPEUTIC ACTIVITIES: CPT

## 2024-08-12 PROCEDURE — 97112 NEUROMUSCULAR REEDUCATION: CPT

## 2024-08-12 PROCEDURE — 97110 THERAPEUTIC EXERCISES: CPT

## 2024-08-12 NOTE — PROGRESS NOTES
"Daily Note     Today's date: 2024  Patient name: Mike Yoo  : 1975  MRN: [id number]  Referring provider: Lazarus, Mark David, MD  Dx:   Encounter Diagnosis     ICD-10-CM    1. S/P decompression of ulnar nerve  Z98.890       2. Entrapment of left ulnar nerve  G56.22                      Subjective: Pt reports that he tried opening a pickle jar this weekend with his L hand and was unable to due to the pain levels. Notes that he recently got different pain meds and is using Voltaren gel.       Objective: See treatment diary below      Assessment: Tolerated treatment well. Patient demonstrated fatigue post treatment, exhibited good technique with therapeutic exercises, and would benefit from continued PT. Continued to focus on wrist and hand strengthening this session. He continues to be most challenged with WB through affected UE but is improving. Re educated pt on the importance of his self stretching.       Plan: Progress treatment as tolerated.       Precautions: s/p L elbow LCL reconstruction, debridement, ulnar nerve decompression 24    POC expires Unit limit Auth Expiration date PT/OT + Visit Limit?    N/A  BOMN                 Visit/Unit Tracking  AUTH Status:  Date 7/10 7/15 7/22 - FOTO  foto        BOMN Used 13 14 15 16 17 18 19 20        Remaining                  FOTO - done         Manuals 8/12   7/10 7/22 7/24 7/29 7/31 8 8   Elbow flex/ext/sup/pro PROM             STM for edema    KT CG        Scar mobs    KT CG                     Neuro Re-Ed             Pt education              Ulnar NG    x10 x10         squeeze Blue 2x10   Green 2x10 5\" Grn 2x10 5\" Grn 2x10 Blue 2x10 Blue 2x10 Blue 2x10 Blue 2x10   Bicep curl 2# 2x10   2# 2x10 2# 2x10 2# 2x10 2# 2x10 2# 2x10 2# 2x10 2# 2x10   Tricep ext Blue 2x10   Grn 2x10 Grn 2x10 Grn 2x10 Grn 2x10 Grn 2x10 Blue 2x10 Blue 2x10   Wall shoulder taps Plinth 1x10 ea    nv nv 1x10 ea 1x10 ea 1x10 ea 1x10 ea " "  WB wrist extension stretch 10\"x5       10\"x5 10\"x5 10\"x5   Ther Ex             Elbow flex/ext PROM             Elbow pro/sup PROM             Elbow flex/ext AROM    2x10 2x10 2x10 2x10 2x10 2x10 HEP   Elbow pro/sup AROM 2x10 2#   2x10 1# 2x10 1# 2x10 1# 2x10 2# 2x10 2# 2x10 2# 2x10 2#   Wrist RD AROM 2x10 1#   2x10 1# 2x10 1# 2x10 1# 2x10 1# 2x10 1# 2x10 1# 2x10 1#   Wrist ext AROM 2x10 2#   2# 2x10 2# 2x10 2# 2x10 2# 2x10 2# 2x10 2# 2x10 2# 2x10   Wrist flex AROM 2x10 2#   2# 2x10 2# 2x10 2# 2x10 2# 2x10 2# 2x10 2# 2x10 2# 2x10   Wrist flex/ext str 10x10\" ea   10x 10\" ea 10x 10\" ea 10x 10\" ea 10x 10\" ea 10x 10\" ea 10x 10\" ea 10x10\" ea   UBE for ROM 4'   4' 4' 4' 4' 4' 4' 4'   Ther Activity             Farmer's carry 5# 3 laps    nv 5# 2 laps 5# 3 laps 5# 3 laps 5# 3 laps 5# 3 laps   SA row TB Blk 2x10    nv Blk 2x10 Blk 2x10 Blk 2x10 Blk 2x10 Blk 2x10   Gait Training                                       Modalities                                                  "

## 2024-08-14 ENCOUNTER — OFFICE VISIT (OUTPATIENT)
Dept: PHYSICAL THERAPY | Facility: CLINIC | Age: 49
End: 2024-08-14
Payer: OTHER MISCELLANEOUS

## 2024-08-14 DIAGNOSIS — Z98.890 S/P DECOMPRESSION OF ULNAR NERVE: Primary | ICD-10-CM

## 2024-08-14 DIAGNOSIS — G56.22 ENTRAPMENT OF LEFT ULNAR NERVE: ICD-10-CM

## 2024-08-14 PROCEDURE — 97112 NEUROMUSCULAR REEDUCATION: CPT

## 2024-08-14 PROCEDURE — 97530 THERAPEUTIC ACTIVITIES: CPT

## 2024-08-14 PROCEDURE — 97110 THERAPEUTIC EXERCISES: CPT

## 2024-08-14 NOTE — PROGRESS NOTES
"Daily Note     Today's date: 2024  Patient name: Mike Yoo  : 1975  MRN: [id number]  Referring provider: Lazarus, Mark David, MD  Dx:   Encounter Diagnosis     ICD-10-CM    1. S/P decompression of ulnar nerve  Z98.890       2. Entrapment of left ulnar nerve  G56.22           Start Time: 1015  Stop Time: 1100  Total time in clinic (min): 45 minutes    Subjective: Pt reports he continues with sharp pain with making a fist and with wrist flexion.       Objective: See treatment diary below      Assessment: Tolerated treatment well. Pt demonstrates good effort throughout session. Minimal curing given to facilitate proper exercise performance and technique. He reports pain and difficulty with gripping things like jars at home, and the handle of the UBE in PT. He reports the pain as being sharp and increased with wrist flexion.  Encouraged consistent use of the medication and cream recently prescribed by his doctor for nerve pain combined with stretches to see if this improves. Patient demonstrated fatigue post treatment, exhibited good technique with therapeutic exercises, and would benefit from continued PT      Plan: Continue per plan of care.      Precautions: s/p L elbow LCL reconstruction, debridement, ulnar nerve decompression 24    POC expires Unit limit Auth Expiration date PT/OT + Visit Limit?    N/A  BOMN                 Visit/Unit Tracking  AUTH Status:  Date 7/10 7/15 7/22 - FOTO  foto       BOMN Used 13 14 15 16 17 18 19 20 21       Remaining                  FOTO - done         Manuals 8/12 8/14  7/10 7/22 7/24 7/29 7/31 8 8   Elbow flex/ext/sup/pro PROM             STM for edema    KT CG        Scar mobs    KT CG                     Neuro Re-Ed             Pt education              Ulnar NG  x10  x10 x10         squeeze Blue 2x10 Blue 2x10  Green 2x10 5\" Grn 2x10 5\" Grn 2x10 Blue 2x10 Blue 2x10 Blue 2x10 Blue 2x10   Bicep curl 2# 2x10 3# 2x10 " " 2# 2x10 2# 2x10 2# 2x10 2# 2x10 2# 2x10 2# 2x10 2# 2x10   Tricep ext Blue 2x10 Blue 2x10  Grn 2x10 Grn 2x10 Grn 2x10 Grn 2x10 Grn 2x10 Blue 2x10 Blue 2x10   Wall shoulder taps Plinth 1x10 ea Nv - time   nv nv 1x10 ea 1x10 ea 1x10 ea 1x10 ea   WB wrist extension stretch 10\"x5 10\"x5      10\"x5 10\"x5 10\"x5   Ther Ex             Elbow flex/ext PROM             Elbow pro/sup PROM             Elbow flex/ext AROM    2x10 2x10 2x10 2x10 2x10 2x10 HEP   Elbow pro/sup AROM 2x10 2# 2# 2x10  2x10 1# 2x10 1# 2x10 1# 2x10 2# 2x10 2# 2x10 2# 2x10 2#   Wrist RD AROM 2x10 1# 2x10 1#  2x10 1# 2x10 1# 2x10 1# 2x10 1# 2x10 1# 2x10 1# 2x10 1#   Wrist ext AROM 2x10 2# 2# 2x10  2# 2x10 2# 2x10 2# 2x10 2# 2x10 2# 2x10 2# 2x10 2# 2x10   Wrist flex AROM 2x10 2# 2# 2x10  2# 2x10 2# 2x10 2# 2x10 2# 2x10 2# 2x10 2# 2x10 2# 2x10   Wrist flex/ext str 10x10\" ea 10x10\" ea  10x 10\" ea 10x 10\" ea 10x 10\" ea 10x 10\" ea 10x 10\" ea 10x 10\" ea 10x10\" ea   UBE for ROM 4' 4'  4' 4' 4' 4' 4' 4' 4'   Ther Activity             Farmer's carry 5# 3 laps 7# 3 laps   nv 5# 2 laps 5# 3 laps 5# 3 laps 5# 3 laps 5# 3 laps   SA row TB Blk 2x10 Blk 2x10   nv Blk 2x10 Blk 2x10 Blk 2x10 Blk 2x10 Blk 2x10   Gait Training                                       Modalities                                                    "

## 2024-08-19 ENCOUNTER — OFFICE VISIT (OUTPATIENT)
Dept: PHYSICAL THERAPY | Facility: CLINIC | Age: 49
End: 2024-08-19
Payer: OTHER MISCELLANEOUS

## 2024-08-19 DIAGNOSIS — Z98.890 S/P DECOMPRESSION OF ULNAR NERVE: Primary | ICD-10-CM

## 2024-08-19 DIAGNOSIS — G56.22 ENTRAPMENT OF LEFT ULNAR NERVE: ICD-10-CM

## 2024-08-19 PROCEDURE — 97110 THERAPEUTIC EXERCISES: CPT

## 2024-08-19 PROCEDURE — 97112 NEUROMUSCULAR REEDUCATION: CPT

## 2024-08-19 PROCEDURE — 97530 THERAPEUTIC ACTIVITIES: CPT

## 2024-08-19 NOTE — PROGRESS NOTES
"Daily Note     Today's date: 2024  Patient name: Mike Yoo  : 1975  MRN: [id number]  Referring provider: Lazarus, Mark David, MD  Dx:   Encounter Diagnosis     ICD-10-CM    1. S/P decompression of ulnar nerve  Z98.890       2. Entrapment of left ulnar nerve  G56.22           Start Time: 1017  Stop Time: 1104  Total time in clinic (min): 47 minutes    Subjective: Pt reports he was sore over the weekend but thinks it was due to the weather.       Objective: See treatment diary below      Assessment: Tolerated treatment well. Patient demonstrated fatigue post treatment, exhibited good technique with therapeutic exercises, and would benefit from continued PT. Continued to progress weights for multiple exercises as noted below to continue improving functional strength. He is challenged by program but notes muscle fatigue at end of session without reproduction of painful symptoms.       Plan: Progress treatment as tolerated.       Precautions: s/p L elbow LCL reconstruction, debridement, ulnar nerve decompression 24    POC expires Unit limit Auth Expiration date PT/OT + Visit Limit?    N/A  BOMN                 Visit/Unit Tracking  AUTH Status:  Date 7/10 7/15 7/22 - FOTO  foto       BOMN Used 13 14 15 16 17 18 19 20 21       Remaining                  FOTO - done         Manuals  8 8   Elbow flex/ext/sup/pro PROM             STM for edema     CG        Scar mobs     CG                     Neuro Re-Ed             Pt education              Ulnar NG  x10   x10         squeeze Blue 2x10 Blue 2x10 HEP  Grn 2x10 5\" Grn 2x10 Blue 2x10 Blue 2x10 Blue 2x10 Blue 2x10   Bicep curl 2# 2x10 3# 2x10 4# 4x5  2# 2x10 2# 2x10 2# 2x10 2# 2x10 2# 2x10 2# 2x10   Tricep ext Blue 2x10 Blue 2x10 Blue 2x10  Grn 2x10 Grn 2x10 Grn 2x10 Grn 2x10 Blue 2x10 Blue 2x10   Wall shoulder taps Plinth 1x10 ea Nv - time 1x10 ea  nv nv 1x10 ea 1x10 ea 1x10 ea " "1x10 ea   WB wrist extension stretch 10\"x5 10\"x5 10\"x5     10\"x5 10\"x5 10\"x5   Ther Ex                                                    Elbow pro/sup AROM 2x10 2# 2# 2x10 3# 2x10  2x10 1# 2x10 1# 2x10 2# 2x10 2# 2x10 2# 2x10 2#   Wrist RD AROM 2x10 1# 2x10 1# 2x10 2#  2x10 1# 2x10 1# 2x10 1# 2x10 1# 2x10 1# 2x10 1#   Wrist ext AROM 2x10 2# 2# 2x10 3# 2x10  2# 2x10 2# 2x10 2# 2x10 2# 2x10 2# 2x10 2# 2x10   Wrist flex AROM 2x10 2# 2# 2x10 3# 2x10  2# 2x10 2# 2x10 2# 2x10 2# 2x10 2# 2x10 2# 2x10   Wrist flex/ext str 10x10\" ea 10x10\" ea 10x 10\" ea  10x 10\" ea 10x 10\" ea 10x 10\" ea 10x 10\" ea 10x 10\" ea 10x10\" ea   UBE for ROM 4' 4' 4'  4' 4' 4' 4' 4' 4'   Ther Activity             Farmer's carry 5# 3 laps 7# 3 laps 7# 3 laps  nv 5# 2 laps 5# 3 laps 5# 3 laps 5# 3 laps 5# 3 laps   SA row TB Blk 2x10 Blk 2x10 Blk 2x10  nv Blk 2x10 Blk 2x10 Blk 2x10 Blk 2x10 Blk 2x10   Gait Training                                       Modalities                                                      "

## 2024-08-21 ENCOUNTER — OFFICE VISIT (OUTPATIENT)
Dept: PHYSICAL THERAPY | Facility: CLINIC | Age: 49
End: 2024-08-21
Payer: OTHER MISCELLANEOUS

## 2024-08-21 DIAGNOSIS — G56.22 ENTRAPMENT OF LEFT ULNAR NERVE: ICD-10-CM

## 2024-08-21 DIAGNOSIS — Z98.890 S/P DECOMPRESSION OF ULNAR NERVE: Primary | ICD-10-CM

## 2024-08-21 PROCEDURE — 97112 NEUROMUSCULAR REEDUCATION: CPT

## 2024-08-21 PROCEDURE — 97530 THERAPEUTIC ACTIVITIES: CPT

## 2024-08-21 PROCEDURE — 97110 THERAPEUTIC EXERCISES: CPT

## 2024-08-21 NOTE — PROGRESS NOTES
"Daily Note     Today's date: 2024  Patient name: Mike Yoo  : 1975  MRN: [id number]  Referring provider: Lazarus, Mark David, MD  Dx:   Encounter Diagnosis     ICD-10-CM    1. S/P decompression of ulnar nerve  Z98.890       2. Entrapment of left ulnar nerve  G56.22           Start Time: 1015  Stop Time: 1100  Total time in clinic (min): 45 minutes    Subjective: Pt reports he was sore after last session but used ice afterwards. He states he was stiff this morning but thinks it's due to the colder weather.      Objective: See treatment diary below      Assessment: Tolerated treatment well. Patient demonstrated fatigue post treatment, exhibited good technique with therapeutic exercises, and would benefit from continued PT. Continued with interventions as noted below to continue improving functional strength and mobility. He is challenged by program but denies any new or increased symptoms during or following session.       Plan: Progress treatment as tolerated.       Precautions: s/p L elbow LCL reconstruction, debridement, ulnar nerve decompression 24    POC expires Unit limit Auth Expiration date PT/OT + Visit Limit?    N/A  BOMN                 Visit/Unit Tracking  AUTH Status:  Date               BOMN Used 25               Remaining                  FOTO - done         Manuals    Elbow flex/ext/sup/pro PROM             STM for edema             Scar mobs                          Neuro Re-Ed             Pt education              Ulnar NG  x10            squeeze Blue 2x10 Blue 2x10 HEP    Blue 2x10 Blue 2x10 Blue 2x10 Blue 2x10   Bicep curl 2# 2x10 3# 2x10 4# 4x5 4# 4x5   2# 2x10 2# 2x10 2# 2x10 2# 2x10   Tricep ext Blue 2x10 Blue 2x10 Blue 2x10 Blue 2x10   Grn 2x10 Grn 2x10 Blue 2x10 Blue 2x10   Wall shoulder taps Plinth 1x10 ea Nv - time 1x10 ea 1x10 ea   1x10 ea 1x10 ea 1x10 ea 1x10 ea   WB wrist extension stretch 10\"x5 10\"x5 10\"x5 " "10\"x5    10\"x5 10\"x5 10\"x5   Ther Ex                                                    Elbow pro/sup AROM 2x10 2# 2# 2x10 3# 2x10 3# 2x10   2x10 2# 2x10 2# 2x10 2# 2x10 2#   Wrist RD AROM 2x10 1# 2x10 1# 2x10 2# 2x10 2#   2x10 1# 2x10 1# 2x10 1# 2x10 1#   Wrist ext AROM 2x10 2# 2# 2x10 3# 2x10 3# 2x10   2# 2x10 2# 2x10 2# 2x10 2# 2x10   Wrist flex AROM 2x10 2# 2# 2x10 3# 2x10 3# 2x10   2# 2x10 2# 2x10 2# 2x10 2# 2x10   Wrist flex/ext str 10x10\" ea 10x10\" ea 10x 10\" ea 10x 10\" ea   10x 10\" ea 10x 10\" ea 10x 10\" ea 10x10\" ea   UBE for ROM 4' 4' 4' 4'   4' 4' 4' 4'   Ther Activity             Farmer's carry 5# 3 laps 7# 3 laps 7# 3 laps 7# 3 laps   5# 3 laps 5# 3 laps 5# 3 laps 5# 3 laps   SA row TB Blk 2x10 Blk 2x10 Blk 2x10 Blk 2x10   Blk 2x10 Blk 2x10 Blk 2x10 Blk 2x10   Gait Training                                       Modalities                                                        "

## 2024-08-26 ENCOUNTER — OFFICE VISIT (OUTPATIENT)
Dept: PHYSICAL THERAPY | Facility: CLINIC | Age: 49
End: 2024-08-26
Payer: OTHER MISCELLANEOUS

## 2024-08-26 DIAGNOSIS — G56.22 ENTRAPMENT OF LEFT ULNAR NERVE: ICD-10-CM

## 2024-08-26 DIAGNOSIS — Z98.890 S/P DECOMPRESSION OF ULNAR NERVE: Primary | ICD-10-CM

## 2024-08-26 PROCEDURE — 97530 THERAPEUTIC ACTIVITIES: CPT

## 2024-08-26 PROCEDURE — 97110 THERAPEUTIC EXERCISES: CPT

## 2024-08-26 PROCEDURE — 97112 NEUROMUSCULAR REEDUCATION: CPT

## 2024-08-26 NOTE — PROGRESS NOTES
"Daily Note     Today's date: 2024  Patient name: Mike Yoo  : 1975  MRN: [id number]  Referring provider: Lazarus, Mark David, MD  Dx:   Encounter Diagnosis     ICD-10-CM    1. S/P decompression of ulnar nerve  Z98.890       2. Entrapment of left ulnar nerve  G56.22           Start Time: 1015  Stop Time: 1100  Total time in clinic (min): 45 minutes    Subjective: Pt reports he is still getting some soreness into his hand.       Objective: See treatment diary below      Assessment: Tolerated treatment well. Patient demonstrated fatigue post treatment, exhibited good technique with therapeutic exercises, and would benefit from continued PT. Pt continues to report some tenderness around his scars. Educated pt on scar desensitization techniques to add to HEP. He was able to progress through program with slight reduction in painful symptoms at end of session.       Plan: Progress treatment as tolerated.       Precautions: s/p L elbow LCL reconstruction, debridement, ulnar nerve decompression 24    POC expires Unit limit Auth Expiration date PT/OT + Visit Limit?    N/A  BOMN                 Visit/Unit Tracking  AUTH Status:  Date              BOMN Used               Remaining                  FOTO - done         Manuals    Elbow flex/ext/sup/pro PROM             STM for edema             Scar mobs                          Neuro Re-Ed             Pt education              Ulnar NG  x10            squeeze Blue 2x10 Blue 2x10 HEP      Blue 2x10 Blue 2x10   Bicep curl 2# 2x10 3# 2x10 4# 4x5 4# 4x5 4# 4x5    2# 2x10 2# 2x10   Tricep ext Blue 2x10 Blue 2x10 Blue 2x10 Blue 2x10 Blk 2x10    Blue 2x10 Blue 2x10   Wall shoulder taps Plinth 1x10 ea Nv - time 1x10 ea 1x10 ea 1x10 ea    1x10 ea 1x10 ea   WB wrist extension stretch 10\"x5 10\"x5 10\"x5 10\"x5 10\"x5    10\"x5 10\"x5   Ther Ex                                                    Elbow pro/sup AROM " "2x10 2# 2# 2x10 3# 2x10 3# 2x10 3# 2x10    2x10 2# 2x10 2#   Wrist RD AROM 2x10 1# 2x10 1# 2x10 2# 2x10 2# 2x10 2#    2x10 1# 2x10 1#   Wrist ext AROM 2x10 2# 2# 2x10 3# 2x10 3# 2x10 3# 2x10    2# 2x10 2# 2x10   Wrist flex AROM 2x10 2# 2# 2x10 3# 2x10 3# 2x10 3# 2x10    2# 2x10 2# 2x10   Wrist flex/ext str 10x10\" ea 10x10\" ea 10x 10\" ea 10x 10\" ea 10x 10\" ea    10x 10\" ea 10x10\" ea   UBE for ROM 4' 4' 4' 4' 4'    4' 4'   Ther Activity             Farmer's carry 5# 3 laps 7# 3 laps 7# 3 laps 7# 3 laps 7# 3 laps    5# 3 laps 5# 3 laps   SA row TB Blk 2x10 Blk 2x10 Blk 2x10 Blk 2x10 Blk 2x10    Blk 2x10 Blk 2x10   Gait Training                                       Modalities                                                          "

## 2024-08-28 ENCOUNTER — APPOINTMENT (OUTPATIENT)
Dept: PHYSICAL THERAPY | Facility: CLINIC | Age: 49
End: 2024-08-28
Payer: OTHER MISCELLANEOUS

## 2024-09-04 ENCOUNTER — OFFICE VISIT (OUTPATIENT)
Dept: PHYSICAL THERAPY | Facility: CLINIC | Age: 49
End: 2024-09-04
Payer: OTHER MISCELLANEOUS

## 2024-09-04 DIAGNOSIS — G56.22 ENTRAPMENT OF LEFT ULNAR NERVE: ICD-10-CM

## 2024-09-04 DIAGNOSIS — Z98.890 S/P DECOMPRESSION OF ULNAR NERVE: Primary | ICD-10-CM

## 2024-09-04 PROCEDURE — 97530 THERAPEUTIC ACTIVITIES: CPT | Performed by: PHYSICAL THERAPIST

## 2024-09-04 PROCEDURE — 97112 NEUROMUSCULAR REEDUCATION: CPT | Performed by: PHYSICAL THERAPIST

## 2024-09-04 PROCEDURE — 97110 THERAPEUTIC EXERCISES: CPT | Performed by: PHYSICAL THERAPIST

## 2024-09-04 NOTE — PROGRESS NOTES
"Daily Note     Today's date: 2024  Patient name: Mike Yoo  : 1975  MRN: [id number]  Referring provider: Lazarus, Mark David, MD  Dx:   Encounter Diagnosis     ICD-10-CM    1. S/P decompression of ulnar nerve  Z98.890       2. Entrapment of left ulnar nerve  G56.22           Start Time: 1015  Stop Time: 1100  Total time in clinic (min): 45 minutes    Subjective: Pt reports he continues to have pain in his L elbow at this time.       Objective: See treatment diary below      Assessment: Tolerated treatment well. Patient demonstrated fatigue post treatment, exhibited good technique with therapeutic exercises, and would benefit from continued PT. Pt was able to progress today with inclusion of fingerweb into pt's exercise program. Pt required min verbal cues to facilitate performance of proper technique. Assess pt response to treatment at next visit.      Plan: Continue per plan of care.      Precautions: s/p L elbow LCL reconstruction, debridement, ulnar nerve decompression 24    POC expires Unit limit Auth Expiration date PT/OT + Visit Limit?    N/A  BOMN                 Visit/Unit Tracking  AUTH Status:  Date             BOMN Used 25 26 27             Remaining                  FOTO - done         Manuals    Elbow flex/ext/sup/pro PROM             STM for edema             Scar mobs                          Neuro Re-Ed             Pt education       4'       Ulnar NG  x10            squeeze Blue 2x10 Blue 2x10 HEP      Blue 2x10 Blue 2x10   Bicep curl 2# 2x10 3# 2x10 4# 4x5 4# 4x5 4# 4x5 4x5 4#   2# 2x10 2# 2x10   Tricep ext Blue 2x10 Blue 2x10 Blue 2x10 Blue 2x10 Blk 2x10 2x10 blk   Blue 2x10 Blue 2x10   Wall shoulder taps Plinth 1x10 ea Nv - time 1x10 ea 1x10 ea 1x10 ea NV   1x10 ea 1x10 ea   WB wrist extension stretch 10\"x5 10\"x5 10\"x5 10\"x5 10\"x5 10\"x5   10\"x5 10\"x5   Ther Ex                                       Fingerweb " "opening/closing      10x ea 3\"       Elbow pro/sup AROM 2x10 2# 2# 2x10 3# 2x10 3# 2x10 3# 2x10 3# 2x10   2x10 2# 2x10 2#   Wrist RD AROM 2x10 1# 2x10 1# 2x10 2# 2x10 2# 2x10 2# 2x10 2#   2x10 1# 2x10 1#   Wrist ext AROM 2x10 2# 2# 2x10 3# 2x10 3# 2x10 3# 2x10 3# 2x10   2# 2x10 2# 2x10   Wrist flex AROM 2x10 2# 2# 2x10 3# 2x10 3# 2x10 3# 2x10 3# 2x10   2# 2x10 2# 2x10   Wrist flex/ext str 10x10\" ea 10x10\" ea 10x 10\" ea 10x 10\" ea 10x 10\" ea 10x 10\" ea   10x 10\" ea 10x10\" ea   UBE for ROM 4' 4' 4' 4' 4' 4'   4' 4'   Ther Activity             Farmer's carry 5# 3 laps 7# 3 laps 7# 3 laps 7# 3 laps 7# 3 laps 7# 3 laps   5# 3 laps 5# 3 laps   SA row TB Blk 2x10 Blk 2x10 Blk 2x10 Blk 2x10 Blk 2x10 2x10 blk   Blk 2x10 Blk 2x10   Gait Training                                       Modalities                                                            "

## 2024-09-09 ENCOUNTER — OFFICE VISIT (OUTPATIENT)
Dept: PHYSICAL THERAPY | Facility: CLINIC | Age: 49
End: 2024-09-09
Payer: OTHER MISCELLANEOUS

## 2024-09-09 DIAGNOSIS — G56.22 ENTRAPMENT OF LEFT ULNAR NERVE: ICD-10-CM

## 2024-09-09 DIAGNOSIS — Z98.890 S/P DECOMPRESSION OF ULNAR NERVE: Primary | ICD-10-CM

## 2024-09-09 PROCEDURE — 97112 NEUROMUSCULAR REEDUCATION: CPT

## 2024-09-09 PROCEDURE — 97110 THERAPEUTIC EXERCISES: CPT

## 2024-09-09 PROCEDURE — 97530 THERAPEUTIC ACTIVITIES: CPT

## 2024-09-09 NOTE — PROGRESS NOTES
"Daily Note     Today's date: 2024  Patient name: Mike Yoo  : 1975  MRN: [id number]  Referring provider: Lazarus, Mark David, MD  Dx:   Encounter Diagnosis     ICD-10-CM    1. S/P decompression of ulnar nerve  Z98.890       2. Entrapment of left ulnar nerve  G56.22                      Subjective: Patient reports joint stiffness, medial elbow pain that radiated. Reports increased swelling at the L elbow joint.     He tried to open a 1/4 gallon of milk, L hand couldn't handle the torque.       Objective: See treatment diary below      Assessment: Tolerated treatment fair. Mild irritation of symptoms noted with TE, more with wrist flexion. Fatigue and pain are limiting ROM and overall function. Continued PT would be beneficial to improve function.          Plan: Continue per plan of care.       Precautions: s/p L elbow LCL reconstruction, debridement, ulnar nerve decompression 24    POC expires Unit limit Auth Expiration date PT/OT + Visit Limit?    N/A  BOMN                 Visit/Unit Tracking  AUTH Status:  Date            BOMN Used 25  27 28            Remaining                  FOTO - done         Manuals    Elbow flex/ext/sup/pro PROM             STM for edema             Scar mobs                          Neuro Re-Ed             Pt education       4'       Ulnar NG  x10            squeeze Blue 2x10 Blue 2x10 HEP      Blue 2x10 Blue 2x10   Bicep curl 2# 2x10 3# 2x10 4# 4x5 4# 4x5 4# 4x5 4x5 4# 4x5 4#  2# 2x10 2# 2x10   Tricep ext Blue 2x10 Blue 2x10 Blue 2x10 Blue 2x10 Blk 2x10 2x10 blk 2x10 blk  Blue 2x10 Blue 2x10   Wall shoulder taps Plinth 1x10 ea Nv - time 1x10 ea 1x10 ea 1x10 ea NV 1x10 ea  1x10 ea 1x10 ea   WB wrist extension stretch 10\"x5 10\"x5 10\"x5 10\"x5 10\"x5 10\"x5 10\"x5  10\"x5 10\"x5   Ther Ex                          Flexbar        10x3\" Red flex      Fingerweb opening/closing      10x ea 3\" 10x ea 3\"    " "  Elbow pro/sup AROM 2x10 2# 2# 2x10 3# 2x10 3# 2x10 3# 2x10 3# 2x10 3# 2x10  2x10 2# 2x10 2#   Wrist RD AROM 2x10 1# 2x10 1# 2x10 2# 2x10 2# 2x10 2# 2x10 2# 2x10 2#  2x10 1# 2x10 1#   Wrist ext AROM 2x10 2# 2# 2x10 3# 2x10 3# 2x10 3# 2x10 3# 2x10 3# 2x10  2# 2x10 2# 2x10   Wrist flex AROM 2x10 2# 2# 2x10 3# 2x10 3# 2x10 3# 2x10 3# 2x10 3# 2x10  2# 2x10 2# 2x10   Wrist flex/ext str 10x10\" ea 10x10\" ea 10x 10\" ea 10x 10\" ea 10x 10\" ea 10x 10\" ea 10x 10\" ea  10x 10\" ea 10x10\" ea   UBE for ROM 4' 4' 4' 4' 4' 4' 4'  4' 4'   Ther Activity             Farmer's carry 5# 3 laps 7# 3 laps 7# 3 laps 7# 3 laps 7# 3 laps 7# 3 laps 7# 3 laps  5# 3 laps 5# 3 laps   SA row TB Blk 2x10 Blk 2x10 Blk 2x10 Blk 2x10 Blk 2x10 2x10 blk 2x10 blk  Blk 2x10 Blk 2x10   Gait Training                                       Modalities                                                              "

## 2024-09-11 ENCOUNTER — APPOINTMENT (OUTPATIENT)
Dept: PHYSICAL THERAPY | Facility: CLINIC | Age: 49
End: 2024-09-11
Payer: OTHER MISCELLANEOUS

## 2024-09-16 ENCOUNTER — EVALUATION (OUTPATIENT)
Dept: PHYSICAL THERAPY | Facility: CLINIC | Age: 49
End: 2024-09-16
Payer: OTHER MISCELLANEOUS

## 2024-09-16 DIAGNOSIS — G56.22 ENTRAPMENT OF LEFT ULNAR NERVE: ICD-10-CM

## 2024-09-16 DIAGNOSIS — Z98.890 S/P DECOMPRESSION OF ULNAR NERVE: Primary | ICD-10-CM

## 2024-09-16 PROCEDURE — 97110 THERAPEUTIC EXERCISES: CPT

## 2024-09-16 PROCEDURE — 97530 THERAPEUTIC ACTIVITIES: CPT

## 2024-09-16 PROCEDURE — 97112 NEUROMUSCULAR REEDUCATION: CPT

## 2024-09-16 NOTE — LETTER
2024    Mark David Lazarus, MD  [address]  [address]  [facility] [address]    Patient: Mike Yoo   YOB: 1975   Date of Visit: 2024     Encounter Diagnosis     ICD-10-CM    1. S/P decompression of ulnar nerve  Z98.890       2. Entrapment of left ulnar nerve  G56.22           Dear Dr. Lazarus:    Thank you for your recent referral of Mike Yoo. Please review the attached evaluation summary from Mike's recent visit.     Please verify that you agree with the plan of care by signing the attached order.     If you have any questions or concerns, please do not hesitate to call.     I sincerely appreciate the opportunity to share in the care of one of your patients and hope to have another opportunity to work with you in the near future.       Sincerely,    Lala Farfan, PT      Referring Provider:      I certify that I have read the below Plan of Care and certify the need for these services furnished under this plan of treatment while under my care.                    Mark David Lazarus, MD  [address]  [address]  [facility] [address]  Via Fax: [phone]          PT Re-Evaluation     Today's date: 2024  Patient name: Mike Yoo  : 1975  MRN: [id number]  Referring provider: Lazarus, Mark David, MD  Dx:   Encounter Diagnosis     ICD-10-CM    1. S/P decompression of ulnar nerve  Z98.890       2. Entrapment of left ulnar nerve  G56.22               Start Time: 0930  Stop Time: 1015  Total time in clinic (min): 45 minutes    Assessment  Impairments: activity intolerance, impaired physical strength and pain with function  Symptom irritability: moderate    Assessment details: Pt is a 50 y/o male who has been seen in outpatient PT s/p left eblow LCL reconstruction and ulnar nerve decompression on . Upon reassessment, pt presents with improved elbow ROM and strength but continues to have limitations due to increased pain. Functionally, these  improvements have led to increased tolerance for lifting, carrying, and performance of his usual ADLs and household tasks. However, he still remains limited with lifting and carrying heavier weights, greater than 10 lbs. He remains out fo work per referring provider and is currently seeing pain management for pain symptoms. He will continue to benefit from skilled PT to address remaining limitations and continue progressing towards stated goals and return to work.     Understanding of Dx/Px/POC: good     Prognosis: good    Goals  STG - 4 weeks  1. L elbow AROM improved to WNL compared to right with minimal to no pain. - met  2.  strength improved to at least 50 lbs with minimal to no pain. - 90%    LTG - 8 weeks  1. FOTO score will improve from 21 to 48 to demonstrate improved functional abilities. - met  2. Pt able to lift at least 5 lbs with L hand with minimal difficulty - met  3. Pt able to lift and carry at least 15 lbs with minimal difficulty.     Plan  Patient would benefit from: skilled physical therapy and PT eval  Planned modality interventions: cryotherapy    Planned therapy interventions: IASTM, joint mobilization, kinesiology taping, manual therapy, nerve gliding, neuromuscular re-education, patient/caregiver education, strengthening, stretching, therapeutic activities, therapeutic exercise, functional ROM exercises, graded exercise and home exercise program    Frequency: 2x week  Plan of Care beginning date: 9/16/2024  Plan of Care expiration date: 11/11/2024  Treatment plan discussed with: patient        Subjective Evaluation    History of Present Illness  Date of onset: 9/27/2022  Date of surgery: 4/9/2024  Mechanism of injury: surgery  Mechanism of injury: Pt had a fall at work on 9/27/22 and injured his left elbow. Due to issues with worker's comp, he did not get surgery until 4/9/24. Pt works for [employer] and has been out of work since the initial injury. He states he has lifting requirements of   lbs. Pt reports stiffness and sharp pain on the inside of his elbow. Had previous physical therapy at a different location.      - Pt reports improvements in elbow mobility and strength as well as a reduction in painful symptoms. He feels he is most limited due to pain at this time, especially with lifting and carrying.   Quality of life: good    Patient Goals  Patient goals for therapy: decreased pain, independence with ADLs/IADLs, increased strength, increased motion and return to work    Pain  Current pain ratin  At best pain ratin  At worst pain ratin  Quality: dull ache, radiating and sharp  Relieving factors: ice and medications  Aggravating factors: lifting    Hand dominance: right    Treatments  Previous treatment: physical therapy        Objective     Palpation     Additional Palpation Details  TTP along medial surgical site, flexor insertion    Active Range of Motion     Left Elbow   Flexion: 130 degrees   Extension: -2 degrees   Forearm supination: 75 degrees   Forearm pronation: 75 degrees     Right Elbow   Normal active range of motion    Additional Active Range of Motion Details  Mild discomfort at end ROM    Strength/Myotome Testing     Left Elbow   Flexion: 4+  Extension: 4+  Forearm supination: 4  Forearm pronation: 4    Right Elbow   Flexion: 5  Extension: 5    Additional Strength Details   strength - R 95 lbs, L 45 lbs               Precautions: s/p L elbow LCL reconstruction, debridement, ulnar nerve decompression 24    POC expires Unit limit Auth Expiration date PT/OT + Visit Limit?    N/A  BOMN                 Visit/Unit Tracking  AUTH Status:  Date 8/21 8/26 9/4 9/9 9/16 - FOTO          BOMN Used 25 26 27 28 29           Remaining                  FOTO - done         Manuals      Elbow flex/ext/sup/pro PROM             STM for edema             Scar mobs                          Neuro Re-Ed             Pt  "education       4'       Ulnar NG  x10            squeeze Blue 2x10 Blue 2x10 HEP          Bicep curl 2# 2x10 3# 2x10 4# 4x5 4# 4x5 4# 4x5 4x5 4# 4x5 4# 4# 4x5     Tricep ext Blue 2x10 Blue 2x10 Blue 2x10 Blue 2x10 Blk 2x10 2x10 blk 2x10 blk 2x10 blk     Wall shoulder taps Plinth 1x10 ea Nv - time 1x10 ea 1x10 ea 1x10 ea NV 1x10 ea 1x10 ea     WB wrist extension stretch 10\"x5 10\"x5 10\"x5 10\"x5 10\"x5 10\"x5 10\"x5 10\" x5     Ther Ex                          Flexbar        10x3\" Red flex 10x3\" red      Fingerweb opening/closing      10x ea 3\" 10x ea 3\" 10x ea 3\"     Elbow pro/sup AROM 2x10 2# 2# 2x10 3# 2x10 3# 2x10 3# 2x10 3# 2x10 3# 2x10 3# 2x10     Wrist RD AROM 2x10 1# 2x10 1# 2x10 2# 2x10 2# 2x10 2# 2x10 2# 2x10 2# 3# 2x10     Wrist ext AROM 2x10 2# 2# 2x10 3# 2x10 3# 2x10 3# 2x10 3# 2x10 3# 2x10 3# 2x10     Wrist flex AROM 2x10 2# 2# 2x10 3# 2x10 3# 2x10 3# 2x10 3# 2x10 3# 2x10 3# 2x10     Wrist flex/ext str 10x10\" ea 10x10\" ea 10x 10\" ea 10x 10\" ea 10x 10\" ea 10x 10\" ea 10x 10\" ea HEP     UBE for ROM 4' 4' 4' 4' 4' 4' 4' 4'     Ther Activity             Farmer's carry 5# 3 laps 7# 3 laps 7# 3 laps 7# 3 laps 7# 3 laps 7# 3 laps 7# 3 laps 9# kb      SA row [place] Blk 2x10 Blk 2x10 Blk 2x10 Blk 2x10 Blk 2x10 2x10 blk 2x10 blk 12# 2x10     Gait Training                                       Modalities                                                            "

## 2024-09-16 NOTE — PROGRESS NOTES
PT Re-Evaluation     Today's date: 2024  Patient name: Mike Yoo  : 1975  MRN: [id number]  Referring provider: Lazarus, Mark David, MD  Dx:   Encounter Diagnosis     ICD-10-CM    1. S/P decompression of ulnar nerve  Z98.890       2. Entrapment of left ulnar nerve  G56.22               Start Time: 0930  Stop Time: 1015  Total time in clinic (min): 45 minutes    Assessment  Impairments: activity intolerance, impaired physical strength and pain with function  Symptom irritability: moderate    Assessment details: Pt is a 50 y/o male who has been seen in outpatient PT s/p left eblow LCL reconstruction and ulnar nerve decompression on . Upon reassessment, pt presents with improved elbow ROM and strength but continues to have limitations due to increased pain. Functionally, these improvements have led to increased tolerance for lifting, carrying, and performance of his usual ADLs and household tasks. However, he still remains limited with lifting and carrying heavier weights, greater than 10 lbs. He remains out fo work per referring provider and is currently seeing pain management for pain symptoms. He will continue to benefit from skilled PT to address remaining limitations and continue progressing towards stated goals and return to work.     Understanding of Dx/Px/POC: good     Prognosis: good    Goals  STG - 4 weeks  1. L elbow AROM improved to WNL compared to right with minimal to no pain. - met  2.  strength improved to at least 50 lbs with minimal to no pain. - 90%    LTG - 8 weeks  1. FOTO score will improve from 21 to 48 to demonstrate improved functional abilities. - met  2. Pt able to lift at least 5 lbs with L hand with minimal difficulty - met  3. Pt able to lift and carry at least 15 lbs with minimal difficulty.     Plan  Patient would benefit from: skilled physical therapy and PT eval  Planned modality interventions: cryotherapy    Planned therapy interventions: IASTM, joint  mobilization, kinesiology taping, manual therapy, nerve gliding, neuromuscular re-education, patient/caregiver education, strengthening, stretching, therapeutic activities, therapeutic exercise, functional ROM exercises, graded exercise and home exercise program    Frequency: 2x week  Plan of Care beginning date: 2024  Plan of Care expiration date: 2024  Treatment plan discussed with: patient        Subjective Evaluation    History of Present Illness  Date of onset: 2022  Date of surgery: 2024  Mechanism of injury: surgery  Mechanism of injury: Pt had a fall at work on 22 and injured his left elbow. Due to issues with worker's comp, he did not get surgery until 24. Pt works for [employer] and has been out of work since the initial injury. He states he has lifting requirements of  lbs. Pt reports stiffness and sharp pain on the inside of his elbow. Had previous physical therapy at a different location.      - Pt reports improvements in elbow mobility and strength as well as a reduction in painful symptoms. He feels he is most limited due to pain at this time, especially with lifting and carrying.   Quality of life: good    Patient Goals  Patient goals for therapy: decreased pain, independence with ADLs/IADLs, increased strength, increased motion and return to work    Pain  Current pain ratin  At best pain ratin  At worst pain ratin  Quality: dull ache, radiating and sharp  Relieving factors: ice and medications  Aggravating factors: lifting    Hand dominance: right    Treatments  Previous treatment: physical therapy        Objective     Palpation     Additional Palpation Details  TTP along medial surgical site, flexor insertion    Active Range of Motion     Left Elbow   Flexion: 130 degrees   Extension: -2 degrees   Forearm supination: 75 degrees   Forearm pronation: 75 degrees     Right Elbow   Normal active range of motion    Additional Active Range of Motion  "Details  Mild discomfort at end ROM    Strength/Myotome Testing     Left Elbow   Flexion: 4+  Extension: 4+  Forearm supination: 4  Forearm pronation: 4    Right Elbow   Flexion: 5  Extension: 5    Additional Strength Details   strength - R 95 lbs, L 45 lbs               Precautions: s/p L elbow LCL reconstruction, debridement, ulnar nerve decompression 4/9/24    POC expires Unit limit Auth Expiration date PT/OT + Visit Limit?   11/11 N/A 12/31 BOMN                 Visit/Unit Tracking  AUTH Status:  Date 8/21 8/26 9/4 9/9 9/16 - FOTO          BOMN Used 25 26 27 28 29           Remaining                  FOTO - done 9/16        Manuals 8/12 8/14 8/19 8/21 8/26 9/4 9/9 9/16     Elbow flex/ext/sup/pro PROM             STM for edema             Scar mobs                          Neuro Re-Ed             Pt education       4'       Ulnar NG  x10            squeeze Blue 2x10 Blue 2x10 HEP          Bicep curl 2# 2x10 3# 2x10 4# 4x5 4# 4x5 4# 4x5 4x5 4# 4x5 4# 4# 4x5     Tricep ext Blue 2x10 Blue 2x10 Blue 2x10 Blue 2x10 Blk 2x10 2x10 blk 2x10 blk 2x10 blk     Wall shoulder taps Plinth 1x10 ea Nv - time 1x10 ea 1x10 ea 1x10 ea NV 1x10 ea 1x10 ea     WB wrist extension stretch 10\"x5 10\"x5 10\"x5 10\"x5 10\"x5 10\"x5 10\"x5 10\" x5     Ther Ex                          Flexbar        10x3\" Red flex 10x3\" red      Fingerweb opening/closing      10x ea 3\" 10x ea 3\" 10x ea 3\"     Elbow pro/sup AROM 2x10 2# 2# 2x10 3# 2x10 3# 2x10 3# 2x10 3# 2x10 3# 2x10 3# 2x10     Wrist RD AROM 2x10 1# 2x10 1# 2x10 2# 2x10 2# 2x10 2# 2x10 2# 2x10 2# 3# 2x10     Wrist ext AROM 2x10 2# 2# 2x10 3# 2x10 3# 2x10 3# 2x10 3# 2x10 3# 2x10 3# 2x10     Wrist flex AROM 2x10 2# 2# 2x10 3# 2x10 3# 2x10 3# 2x10 3# 2x10 3# 2x10 3# 2x10     Wrist flex/ext str 10x10\" ea 10x10\" ea 10x 10\" ea 10x 10\" ea 10x 10\" ea 10x 10\" ea 10x 10\" ea HEP     UBE for ROM 4' 4' 4' 4' 4' 4' 4' 4'     Ther Activity             Farmer's carry 5# 3 laps 7# 3 laps 7# 3 laps 7# 3 laps " 7# 3 laps 7# 3 laps 7# 3 laps 9# kb      SA row [place] Blk 2x10 Blk 2x10 Blk 2x10 Blk 2x10 Blk 2x10 2x10 blk 2x10 blk 12# 2x10     Gait Training                                       Modalities

## 2024-09-18 ENCOUNTER — OFFICE VISIT (OUTPATIENT)
Dept: PHYSICAL THERAPY | Facility: CLINIC | Age: 49
End: 2024-09-18
Payer: OTHER MISCELLANEOUS

## 2024-09-18 DIAGNOSIS — G56.22 ENTRAPMENT OF LEFT ULNAR NERVE: ICD-10-CM

## 2024-09-18 DIAGNOSIS — Z98.890 S/P DECOMPRESSION OF ULNAR NERVE: Primary | ICD-10-CM

## 2024-09-18 PROCEDURE — 97110 THERAPEUTIC EXERCISES: CPT

## 2024-09-18 PROCEDURE — 97112 NEUROMUSCULAR REEDUCATION: CPT

## 2024-09-18 PROCEDURE — 97530 THERAPEUTIC ACTIVITIES: CPT

## 2024-09-18 NOTE — PROGRESS NOTES
Daily Note     Today's date: 2024  Patient name: Mike Yoo  : 1975  MRN: [id number]  Referring provider: Lazarus, Mark David, MD  Dx:   Encounter Diagnosis     ICD-10-CM    1. S/P decompression of ulnar nerve  Z98.890       2. Entrapment of left ulnar nerve  G56.22           Start Time: 923  Stop Time: 1005  Total time in clinic (min): 42 minutes    Subjective: Pt reports he had some cracking on the inside of his elbow today, His wrist isn't too bad today.       Objective: See treatment diary below      Assessment: Tolerated treatment well. Pt demonstrates good effort throughout session. Minimal cueing given to facilitate proper exercise performance and technique. He reports some minor wrist pain after wrist DB exercises, and some pressure in his elbow after farmers carries. He performs all exercises as outlined below. Continue to progress as tolerated. Patient demonstrated fatigue post treatment, exhibited good technique with therapeutic exercises, and would benefit from continued PT      Plan: Continue per plan of care.      Precautions: s/p L elbow LCL reconstruction, debridement, ulnar nerve decompression 24    POC expires Unit limit Auth Expiration date PT/OT + Visit Limit?    N/A  BOMN                 Visit/Unit Tracking  AUTH Status:  Date  - FOTO          BOMN Used 25 26 27 28 29 30          Remaining                  FOTO - done         Manuals     Elbow flex/ext/sup/pro PROM             STM for edema             Scar mobs                          Neuro Re-Ed             Pt education       4'       Ulnar NG  x10            squeeze Blue 2x10 Blue 2x10 HEP          Bicep curl 2# 2x10 3# 2x10 4# 4x5 4# 4x5 4# 4x5 4x5 4# 4x5 4# 4# 4x5 4# 4x5    Tricep ext Blue 2x10 Blue 2x10 Blue 2x10 Blue 2x10 Blk 2x10 2x10 blk 2x10 blk 2x10 blk 2x10 blk    Wall shoulder taps Plinth 1x10 ea Nv - time 1x10 ea 1x10 ea 1x10  "ea NV 1x10 ea 1x10 ea 1x10 ea    WB wrist extension stretch 10\"x5 10\"x5 10\"x5 10\"x5 10\"x5 10\"x5 10\"x5 10\" x5 10\" x5    Ther Ex                          Flexbar        10x3\" Red flex 10x3\" red  10x3\" red     Fingerweb opening/closing      10x ea 3\" 10x ea 3\" 10x ea 3\" 10x ea 3\"    Elbow pro/sup AROM 2x10 2# 2# 2x10 3# 2x10 3# 2x10 3# 2x10 3# 2x10 3# 2x10 3# 2x10 3# 2x10    Wrist RD AROM 2x10 1# 2x10 1# 2x10 2# 2x10 2# 2x10 2# 2x10 2# 2x10 2# 3# 2x10 3# 2x10    Wrist ext AROM 2x10 2# 2# 2x10 3# 2x10 3# 2x10 3# 2x10 3# 2x10 3# 2x10 3# 2x10 3# 2x10    Wrist flex AROM 2x10 2# 2# 2x10 3# 2x10 3# 2x10 3# 2x10 3# 2x10 3# 2x10 3# 2x10 3# 2x10    Wrist flex/ext str 10x10\" ea 10x10\" ea 10x 10\" ea 10x 10\" ea 10x 10\" ea 10x 10\" ea 10x 10\" ea HEP     UBE for ROM 4' 4' 4' 4' 4' 4' 4' 4' 4'    Ther Activity             Farmer's carry 5# 3 laps 7# 3 laps 7# 3 laps 7# 3 laps 7# 3 laps 7# 3 laps 7# 3 laps 9# kb  9# kb 3 laps    SA row [place] Blk 2x10 Blk 2x10 Blk 2x10 Blk 2x10 Blk 2x10 2x10 blk 2x10 blk 12# 2x10 12# 2x10    Gait Training                                       Modalities                                            "

## 2024-09-23 ENCOUNTER — OFFICE VISIT (OUTPATIENT)
Dept: PHYSICAL THERAPY | Facility: CLINIC | Age: 49
End: 2024-09-23
Payer: OTHER MISCELLANEOUS

## 2024-09-23 DIAGNOSIS — G56.22 ENTRAPMENT OF LEFT ULNAR NERVE: ICD-10-CM

## 2024-09-23 DIAGNOSIS — Z98.890 S/P DECOMPRESSION OF ULNAR NERVE: Primary | ICD-10-CM

## 2024-09-23 PROCEDURE — 97530 THERAPEUTIC ACTIVITIES: CPT

## 2024-09-23 PROCEDURE — 97112 NEUROMUSCULAR REEDUCATION: CPT

## 2024-09-23 PROCEDURE — 97110 THERAPEUTIC EXERCISES: CPT

## 2024-09-23 NOTE — PROGRESS NOTES
Daily Note     Today's date: 2024  Patient name: Mike Yoo  : 1975  MRN: [id number]  Referring provider: Lazarus, Mark David, MD  Dx:   Encounter Diagnosis     ICD-10-CM    1. S/P decompression of ulnar nerve  Z98.890       2. Entrapment of left ulnar nerve  G56.22           Start Time: 1015  Stop Time: 1100  Total time in clinic (min): 45 minutes    Subjective: pt reports his elbow and wrist are hurting a bit more today than usual.       Objective: See treatment diary below      Assessment: Tolerated treatment well. Pt demonstrates good effort throughout session. Minimal cueing given to facilitate proper exercise performance and technique. He reports continued cracking in his elbow and wrist with exericses. He continues to tolerate exercises in current program with discomfort. Patient demonstrated fatigue post treatment, exhibited good technique with therapeutic exercises, and would benefit from continued PT      Plan: Continue per plan of care.      Precautions: s/p L elbow LCL reconstruction, debridement, ulnar nerve decompression 24    POC expires Unit limit Auth Expiration date PT/OT + Visit Limit?    N/A  BOMN                 Visit/Unit Tracking  AUTH Status:  Date  - FOTO         BOMN Used 25 26 27 28 29 30 31         Remaining                  FOTO - done         Manuals    Elbow flex/ext/sup/pro PROM             STM for edema             Scar mobs                          Neuro Re-Ed             Pt education       4'       Ulnar NG  x10            squeeze Blue 2x10 Blue 2x10 HEP          Bicep curl 2# 2x10 3# 2x10 4# 4x5 4# 4x5 4# 4x5 4x5 4# 4x5 4# 4# 4x5 4# 4x5 4# 4x5   Tricep ext Blue 2x10 Blue 2x10 Blue 2x10 Blue 2x10 Blk 2x10 2x10 blk 2x10 blk 2x10 blk 2x10 blk 2x10 blk   Wall shoulder taps Plinth 1x10 ea Nv - time 1x10 ea 1x10 ea 1x10 ea NV 1x10 ea 1x10 ea 1x10 ea 1x10 ea   WB wrist  "extension stretch 10\"x5 10\"x5 10\"x5 10\"x5 10\"x5 10\"x5 10\"x5 10\" x5 10\" x5 10\" x5   Ther Ex                          Flexbar        10x3\" Red flex 10x3\" red  10x3\" red  10x3\" red   Fingerweb opening/closing      10x ea 3\" 10x ea 3\" 10x ea 3\" 10x ea 3\" 10x ea 3\"   Elbow pro/sup AROM 2x10 2# 2# 2x10 3# 2x10 3# 2x10 3# 2x10 3# 2x10 3# 2x10 3# 2x10 3# 2x10 3# 2x10   Wrist RD AROM 2x10 1# 2x10 1# 2x10 2# 2x10 2# 2x10 2# 2x10 2# 2x10 2# 3# 2x10 3# 2x10 3# 2x10   Wrist ext AROM 2x10 2# 2# 2x10 3# 2x10 3# 2x10 3# 2x10 3# 2x10 3# 2x10 3# 2x10 3# 2x10 3# 2x10   Wrist flex AROM 2x10 2# 2# 2x10 3# 2x10 3# 2x10 3# 2x10 3# 2x10 3# 2x10 3# 2x10 3# 2x10 3# 2x10   Wrist flex/ext str 10x10\" ea 10x10\" ea 10x 10\" ea 10x 10\" ea 10x 10\" ea 10x 10\" ea 10x 10\" ea HEP     UBE for ROM 4' 4' 4' 4' 4' 4' 4' 4' 4' 4'   Ther Activity             Farmer's carry 5# 3 laps 7# 3 laps 7# 3 laps 7# 3 laps 7# 3 laps 7# 3 laps 7# 3 laps 9# kb  9# kb 3 laps 9# kb 3 laps   SA row [place] Blk 2x10 Blk 2x10 Blk 2x10 Blk 2x10 Blk 2x10 2x10 blk 2x10 blk 12# 2x10 12# 2x10 12# 2x10   Gait Training                                       Modalities                                              "

## 2024-09-25 ENCOUNTER — OFFICE VISIT (OUTPATIENT)
Dept: PHYSICAL THERAPY | Facility: CLINIC | Age: 49
End: 2024-09-25
Payer: OTHER MISCELLANEOUS

## 2024-09-25 DIAGNOSIS — Z98.890 S/P DECOMPRESSION OF ULNAR NERVE: Primary | ICD-10-CM

## 2024-09-25 DIAGNOSIS — G56.22 ENTRAPMENT OF LEFT ULNAR NERVE: ICD-10-CM

## 2024-09-25 PROCEDURE — 97530 THERAPEUTIC ACTIVITIES: CPT

## 2024-09-25 PROCEDURE — 97112 NEUROMUSCULAR REEDUCATION: CPT

## 2024-09-25 PROCEDURE — 97110 THERAPEUTIC EXERCISES: CPT

## 2024-09-25 NOTE — PROGRESS NOTES
"Daily Note     Today's date: 2024  Patient name: Mike Yoo  : 1975  MRN: [id number]  Referring provider: Lazarus, Mark David, MD  Dx:   Encounter Diagnosis     ICD-10-CM    1. S/P decompression of ulnar nerve  Z98.890       2. Entrapment of left ulnar nerve  G56.22           Start Time: 1016  Stop Time: 1100  Total time in clinic (min): 44 minutes    Subjective: Pt reports his elbow is very sore today but thinks it could be due to the weather.       Objective: See treatment diary below      Assessment: Tolerated treatment well. Patient demonstrated fatigue post treatment, exhibited good technique with therapeutic exercises, and would benefit from continued PT. Held on true progressions this sessions due to complaints of elbow soreness. He was able to complete all exercises noted denying any new or increased painful symptoms.       Plan: Progress treatment as tolerated.       Precautions: s/p L elbow LCL reconstruction, debridement, ulnar nerve decompression 24    POC expires Unit limit Auth Expiration date PT/OT + Visit Limit?    N/A  BOMN                 Visit/Unit Tracking  AUTH Status:  Date  - FOTO        BOMN Used 25 26 27 28 29 30 31 32        Remaining                  FOTO - done         Manuals    Elbow flex/ext/sup/pro PROM             STM for edema             Scar mobs                          Neuro Re-Ed             Pt education       4'       Ulnar NG              squeeze   HEP          Bicep curl 4# 4x5  4# 4x5 4# 4x5 4# 4x5 4x5 4# 4x5 4# 4# 4x5 4# 4x5 4# 4x5   Tricep ext 2x10 blk  Blue 2x10 Blue 2x10 Blk 2x10 2x10 blk 2x10 blk 2x10 blk 2x10 blk 2x10 blk   Wall shoulder taps 1x10 ea  1x10 ea 1x10 ea 1x10 ea NV 1x10 ea 1x10 ea 1x10 ea 1x10 ea   WB wrist extension stretch 10\"x5  10\"x5 10\"x5 10\"x5 10\"x5 10\"x5 10\" x5 10\" x5 10\" x5   Ther Ex                          Flexbar  20x3\" red      " "10x3\" Red flex 10x3\" red  10x3\" red  10x3\" red   Fingerweb opening/closing 20x ea 3\"     10x ea 3\" 10x ea 3\" 10x ea 3\" 10x ea 3\" 10x ea 3\"   Elbow pro/sup AROM 3# 2x10  3# 2x10 3# 2x10 3# 2x10 3# 2x10 3# 2x10 3# 2x10 3# 2x10 3# 2x10   Wrist RD AROM 3# 2x10  2x10 2# 2x10 2# 2x10 2# 2x10 2# 2x10 2# 3# 2x10 3# 2x10 3# 2x10   Wrist ext AROM 3# 2x10  3# 2x10 3# 2x10 3# 2x10 3# 2x10 3# 2x10 3# 2x10 3# 2x10 3# 2x10   Wrist flex AROM 3# 2x10  3# 2x10 3# 2x10 3# 2x10 3# 2x10 3# 2x10 3# 2x10 3# 2x10 3# 2x10   Wrist flex/ext str   10x 10\" ea 10x 10\" ea 10x 10\" ea 10x 10\" ea 10x 10\" ea HEP     UBE for ROM 4'  4' 4' 4' 4' 4' 4' 4' 4'   Ther Activity             Lara's carry 9# kb 3 laps  7# 3 laps 7# 3 laps 7# 3 laps 7# 3 laps 7# 3 laps 9# kb  9# kb 3 laps 9# kb 3 laps   SA row [place] 12# 2x10  Blk 2x10 Blk 2x10 Blk 2x10 2x10 blk 2x10 blk 12# 2x10 12# 2x10 12# 2x10   Gait Training                                       Modalities                                                "

## 2024-09-30 ENCOUNTER — OFFICE VISIT (OUTPATIENT)
Dept: PHYSICAL THERAPY | Facility: CLINIC | Age: 49
End: 2024-09-30
Payer: OTHER MISCELLANEOUS

## 2024-09-30 DIAGNOSIS — G56.22 ENTRAPMENT OF LEFT ULNAR NERVE: ICD-10-CM

## 2024-09-30 DIAGNOSIS — Z98.890 S/P DECOMPRESSION OF ULNAR NERVE: Primary | ICD-10-CM

## 2024-09-30 PROCEDURE — 97112 NEUROMUSCULAR REEDUCATION: CPT

## 2024-09-30 PROCEDURE — 97530 THERAPEUTIC ACTIVITIES: CPT

## 2024-09-30 PROCEDURE — 97110 THERAPEUTIC EXERCISES: CPT

## 2024-09-30 NOTE — PROGRESS NOTES
"Daily Note     Today's date: 2024  Patient name: Mike Yoo  : 1975  MRN: [id number]  Referring provider: Lazarus, Mark David, MD  Dx:   Encounter Diagnosis     ICD-10-CM    1. S/P decompression of ulnar nerve  Z98.890       2. Entrapment of left ulnar nerve  G56.22                      Subjective: Pt reports his scar and the tip of his elbow was more swollen this weekend.       Objective: See treatment diary below      Assessment: Tolerated treatment well. Pt demonstrates good effort throughout session. Minimal cueing given to facilitate proper exercise performance and technique. He reports some discomfort in his elbow mostly today compared to his wrist. Discomfort noted with exercises but he is still able to complete without modification. Patient demonstrated fatigue post treatment, exhibited good technique with therapeutic exercises, and would benefit from continued PT      Plan: Continue per plan of care.      Precautions: s/p L elbow LCL reconstruction, debridement, ulnar nerve decompression 24    POC expires Unit limit Auth Expiration date PT/OT + Visit Limit?    N/A  BOMN                 Visit/Unit Tracking  AUTH Status:  Date  - FOTO       BOMN Used 25 26 27 28 29 30 31 32 33       Remaining                  FOTO - done         Manuals    Elbow flex/ext/sup/pro PROM             STM for edema             Scar mobs                          Neuro Re-Ed             Pt education       4'       Ulnar NG              squeeze             Bicep curl 4# 4x5 4# 4x5    4x5 4# 4x5 4# 4# 4x5 4# 4x5 4# 4x5   Tricep ext 2x10 blk 2x10 blk    2x10 blk 2x10 blk 2x10 blk 2x10 blk 2x10 blk   Wall shoulder taps 1x10 ea 1x10 ea    NV 1x10 ea 1x10 ea 1x10 ea 1x10 ea   WB wrist extension stretch 10\"x5 10\"x5    10\"x5 10\"x5 10\" x5 10\" x5 10\" x5   Ther Ex                          Flexbar  20x3\" red 20x3\" red     10x3\" Red " "flex 10x3\" red  10x3\" red  10x3\" red   Fingerweb opening/closing 20x ea 3\" 20x ea 3\"    10x ea 3\" 10x ea 3\" 10x ea 3\" 10x ea 3\" 10x ea 3\"   Elbow pro/sup AROM 3# 2x10 3# 2x10    3# 2x10 3# 2x10 3# 2x10 3# 2x10 3# 2x10   Wrist RD AROM 3# 2x10 3# 2x10    2x10 2# 2x10 2# 3# 2x10 3# 2x10 3# 2x10   Wrist ext AROM 3# 2x10 3# 2x10    3# 2x10 3# 2x10 3# 2x10 3# 2x10 3# 2x10   Wrist flex AROM 3# 2x10 3# 2x10    3# 2x10 3# 2x10 3# 2x10 3# 2x10 3# 2x10   Wrist flex/ext str      10x 10\" ea 10x 10\" ea HEP     UBE for ROM 4' 4'    4' 4' 4' 4' 4'   Ther Activity             Lara's carry 9# kb 3 laps 9# kb 3 laps    7# 3 laps 7# 3 laps 9# kb  9# kb 3 laps 9# kb 3 laps   SA row [place] 12# 2x10 12# 2x10    2x10 blk 2x10 blk 12# 2x10 12# 2x10 12# 2x10   Gait Training                                       Modalities                                                  "

## 2024-10-02 ENCOUNTER — OFFICE VISIT (OUTPATIENT)
Dept: PHYSICAL THERAPY | Facility: CLINIC | Age: 49
End: 2024-10-02
Payer: OTHER MISCELLANEOUS

## 2024-10-02 DIAGNOSIS — Z98.890 S/P DECOMPRESSION OF ULNAR NERVE: Primary | ICD-10-CM

## 2024-10-02 DIAGNOSIS — G56.22 ENTRAPMENT OF LEFT ULNAR NERVE: ICD-10-CM

## 2024-10-02 PROCEDURE — 97112 NEUROMUSCULAR REEDUCATION: CPT | Performed by: PHYSICAL THERAPIST

## 2024-10-02 PROCEDURE — 97530 THERAPEUTIC ACTIVITIES: CPT | Performed by: PHYSICAL THERAPIST

## 2024-10-02 PROCEDURE — 97110 THERAPEUTIC EXERCISES: CPT | Performed by: PHYSICAL THERAPIST

## 2024-10-02 NOTE — PROGRESS NOTES
"Daily Note     Today's date: 10/2/2024  Patient name: Mike Yoo  : 1975  MRN: [id number]  Referring provider: Lazarus, Mark David, MD  Dx:   Encounter Diagnosis     ICD-10-CM    1. S/P decompression of ulnar nerve  Z98.890       2. Entrapment of left ulnar nerve  G56.22           Start Time: 1015  Stop Time: 1100  Total time in clinic (min): 45 minutes    Subjective: Pt reports he is having some tenderness of his L elbow at the start of therapy today.      Objective: See treatment diary below      Assessment: Tolerated treatment well. Patient demonstrated fatigue post treatment, exhibited good technique with therapeutic exercises, and would benefit from continued PT. Pt was able to progress today by increasing resistance for his flexbar exercises. Pt continues to report pain and discomfort during performance of higher level exercises. Pt required min verbal cues to facilitate performance of proper technique. Assess pt response to treatment at next visit.      Plan: Continue per plan of care.      Precautions: s/p L elbow LCL reconstruction, debridement, ulnar nerve decompression 24    POC expires Unit limit Auth Expiration date PT/OT + Visit Limit?    N/A  BOMN                 Visit/Unit Tracking  AUTH Status:  Date  - FOTO 9/18 9/23 9/25 9/30 10/2     BOMN Used 25 26 27 28 29 30 31 32 33 34      Remaining                  FOTO - done         Manuals 9/25 9/30 10/2   9/4 9/9 9/16 9/18 9/23   Elbow flex/ext/sup/pro PROM             STM for edema             Scar mobs                          Neuro Re-Ed             Pt education    4'   4'       Ulnar NG              squeeze             Bicep curl 4# 4x5 4# 4x5 4# 4x5   4x5 4# 4x5 4# 4# 4x5 4# 4x5 4# 4x5   Tricep ext 2x10 blk 2x10 blk 2x10 blk   2x10 blk 2x10 blk 2x10 blk 2x10 blk 2x10 blk   Wall shoulder taps 1x10 ea 1x10 ea 1x10 ea   NV 1x10 ea 1x10 ea 1x10 ea 1x10 ea   WB wrist extension stretch 10\"x5 10\"x5 " "10\"x5   10\"x5 10\"x5 10\" x5 10\" x5 10\" x5   Ther Ex                          Flexbar  20x3\" red 20x3\" red 20x 3\" grn    10x3\" Red flex 10x3\" red  10x3\" red  10x3\" red   Fingerweb opening/closing 20x ea 3\" 20x ea 3\" 20x ea 3\"   10x ea 3\" 10x ea 3\" 10x ea 3\" 10x ea 3\" 10x ea 3\"   Elbow pro/sup AROM 3# 2x10 3# 2x10 3# 2x10   3# 2x10 3# 2x10 3# 2x10 3# 2x10 3# 2x10   Wrist RD AROM 3# 2x10 3# 2x10 3# 2x10   2x10 2# 2x10 2# 3# 2x10 3# 2x10 3# 2x10   Wrist ext AROM 3# 2x10 3# 2x10 3# 2x10   3# 2x10 3# 2x10 3# 2x10 3# 2x10 3# 2x10   Wrist flex AROM 3# 2x10 3# 2x10 3# 2x10   3# 2x10 3# 2x10 3# 2x10 3# 2x10 3# 2x10   Wrist flex/ext str      10x 10\" ea 10x 10\" ea HEP     UBE for ROM 4' 4' 4'   4' 4' 4' 4' 4'   Ther Activity             Lara's carry 9# kb 3 laps 9# kb 3 laps 9# kb 3 laps   7# 3 laps 7# 3 laps 9# kb  9# kb 3 laps 9# kb 3 laps   SA row [place] 12# 2x10 12# 2x10 2x10 12#   2x10 blk 2x10 blk 12# 2x10 12# 2x10 12# 2x10   Gait Training                                       Modalities                                                    "

## 2024-10-06 NOTE — PROGRESS NOTES
"Daily Note     Today's date: 10/7/2024  Patient name: Mike Yoo  : 1975  MRN: [id number]  Referring provider: Lazarus, Mark David, MD  Dx:   Encounter Diagnosis     ICD-10-CM    1. S/P decompression of ulnar nerve  Z98.890       2. Entrapment of left ulnar nerve  G56.22                      Subjective: The patient states that the primary focus of his pain today is around his elbow, had a pop in his elbow earlier this morning, felt it was from scar tissue.  Pain is not radiating down his arm towards his hand like it normally does.        Objective: See treatment diary below      Assessment: Tolerated treatment well. He had some discomfort with completing web TE but no increase in pain upon completion of this exercise.  Pain level remains the same at end of session but he reports elbow feeling looser to end.  Patient would benefit from continued PT to improve function.        Plan: Continue per plan of care.      Precautions: s/p L elbow LCL reconstruction, debridement, ulnar nerve decompression 24    POC expires Unit limit Auth Expiration date PT/OT + Visit Limit?    N/A  BOMN                 Visit/Unit Tracking  AUTH Status:  Date  - FOTO 9/18 9/23 9/25 9/30 10/2 10/7    BOMN Used 25 26 27 28 29 30 31 32 33 34 35     Remaining                  FOTO - done         Manuals 9/25 9/30 10/2 10/7  9/4 9/9 9/16 9/18 9/23   Elbow flex/ext/sup/pro PROM             STM for edema             Scar mobs                          Neuro Re-Ed             Pt education    4'   4'       Ulnar NG              squeeze             Bicep curl 4# 4x5 4# 4x5 4# 4x5 4# 4x5  4x5 4# 4x5 4# 4# 4x5 4# 4x5 4# 4x5   Tricep ext 2x10 blk 2x10 blk 2x10 blk 2x10 Blk  2x10 blk 2x10 blk 2x10 blk 2x10 blk 2x10 blk   Wall shoulder taps 1x10 ea 1x10 ea 1x10 ea 10x ea  NV 1x10 ea 1x10 ea 1x10 ea 1x10 ea   WB wrist extension stretch 10\"x5 10\"x5 10\"x5 10\"x5  10\"x5 10\"x5 10\" x5 10\" x5 10\" x5   Ther Ex       " "                   Flexbar  20x3\" red 20x3\" red 20x 3\" grn 3\"x20 Grn   10x3\" Red flex 10x3\" red  10x3\" red  10x3\" red   Fingerweb opening/closing 20x ea 3\" 20x ea 3\" 20x ea 3\" 3\"x20 ea  10x ea 3\" 10x ea 3\" 10x ea 3\" 10x ea 3\" 10x ea 3\"   Elbow pro/sup AROM 3# 2x10 3# 2x10 3# 2x10 3# 2x10  3# 2x10 3# 2x10 3# 2x10 3# 2x10 3# 2x10   Wrist RD AROM 3# 2x10 3# 2x10 3# 2x10 3# 2x10  2x10 2# 2x10 2# 3# 2x10 3# 2x10 3# 2x10   Wrist ext AROM 3# 2x10 3# 2x10 3# 2x10 3# 2x10  3# 2x10 3# 2x10 3# 2x10 3# 2x10 3# 2x10   Wrist flex AROM 3# 2x10 3# 2x10 3# 2x10 3# 2x10  3# 2x10 3# 2x10 3# 2x10 3# 2x10 3# 2x10   Wrist flex/ext str      10x 10\" ea 10x 10\" ea HEP     UBE for ROM 4' 4' 4' 4'  4' 4' 4' 4' 4'   Ther Activity             Lara's carry 9# kb 3 laps 9# kb 3 laps 9# kb 3 laps 9# kb  3 laps  7# 3 laps 7# 3 laps 9# kb  9# kb 3 laps 9# kb 3 laps   SA row [place] 12# 2x10 12# 2x10 2x10 12# 2x10 12#  2x10 blk 2x10 blk 12# 2x10 12# 2x10 12# 2x10   Gait Training                                       Modalities                                                      "

## 2024-10-07 ENCOUNTER — OFFICE VISIT (OUTPATIENT)
Dept: PHYSICAL THERAPY | Facility: CLINIC | Age: 49
End: 2024-10-07
Payer: OTHER MISCELLANEOUS

## 2024-10-07 DIAGNOSIS — G56.22 ENTRAPMENT OF LEFT ULNAR NERVE: ICD-10-CM

## 2024-10-07 DIAGNOSIS — Z98.890 S/P DECOMPRESSION OF ULNAR NERVE: Primary | ICD-10-CM

## 2024-10-07 PROCEDURE — 97110 THERAPEUTIC EXERCISES: CPT | Performed by: PHYSICAL THERAPIST

## 2024-10-07 PROCEDURE — 97112 NEUROMUSCULAR REEDUCATION: CPT | Performed by: PHYSICAL THERAPIST

## 2024-10-07 PROCEDURE — 97530 THERAPEUTIC ACTIVITIES: CPT | Performed by: PHYSICAL THERAPIST

## 2024-10-09 ENCOUNTER — OFFICE VISIT (OUTPATIENT)
Dept: PHYSICAL THERAPY | Facility: CLINIC | Age: 49
End: 2024-10-09
Payer: OTHER MISCELLANEOUS

## 2024-10-09 DIAGNOSIS — G56.22 ENTRAPMENT OF LEFT ULNAR NERVE: ICD-10-CM

## 2024-10-09 DIAGNOSIS — Z98.890 S/P DECOMPRESSION OF ULNAR NERVE: Primary | ICD-10-CM

## 2024-10-09 PROCEDURE — 97110 THERAPEUTIC EXERCISES: CPT

## 2024-10-09 PROCEDURE — 97112 NEUROMUSCULAR REEDUCATION: CPT

## 2024-10-09 PROCEDURE — 97530 THERAPEUTIC ACTIVITIES: CPT

## 2024-10-09 NOTE — PROGRESS NOTES
"Daily Note     Today's date: 10/9/2024  Patient name: Mike Yoo  : 1975  MRN: [id number]  Referring provider: Lazarus, Mark David, MD  Dx:   Encounter Diagnosis     ICD-10-CM    1. S/P decompression of ulnar nerve  Z98.890       2. Entrapment of left ulnar nerve  G56.22           Start Time: 1015  Stop Time: 1100  Total time in clinic (min): 45 minutes    Subjective: Pt reports he is stiff today from the weather. He sees pain management tomorrow.       Objective: See treatment diary below      Assessment: Tolerated treatment well. Patient demonstrated fatigue post treatment, exhibited good technique with therapeutic exercises, and would benefit from continued PT. Pt continues to work through program noting a reduction in stiffness at end of session but no change in pain. He requires less verbal cuing overall to ensure proper form with exercises.      Plan: Progress treatment as tolerated.       Precautions: s/p L elbow LCL reconstruction, debridement, ulnar nerve decompression 24    POC expires Unit limit Auth Expiration date PT/OT + Visit Limit?    N/A  BOMN                 Visit/Unit Tracking  AUTH Status:  Date  - FOTO 9/18 9/23 9/25 9/30 10/2 10/7 10/9   BOMN Used 25 26 27 28 29 30 31 32 33 34 35 36    Remaining                  FOTO - done         Manuals 9/25 9/30 10/2 10/7 10/9  9/9 9/16 9/18 9/23   Elbow flex/ext/sup/pro PROM             STM for edema             Scar mobs                          Neuro Re-Ed             Pt education    4'          Ulnar NG              squeeze             Bicep curl 4# 4x5 4# 4x5 4# 4x5 4# 4x5 5# 4x5  4x5 4# 4# 4x5 4# 4x5 4# 4x5   Tricep ext 2x10 blk 2x10 blk 2x10 blk 2x10 Blk 2x10 blk  2x10 blk 2x10 blk 2x10 blk 2x10 blk   Wall shoulder taps 1x10 ea 1x10 ea 1x10 ea 10x ea 10x ea  1x10 ea 1x10 ea 1x10 ea 1x10 ea   WB wrist extension stretch 10\"x5 10\"x5 10\"x5 10\"x5   10\"x5 10\" x5 10\" x5 10\" x5   Ther Ex                    " "      Flexbar  20x3\" red 20x3\" red 20x 3\" grn 3\"x20 Grn 20x3\" grn  10x3\" Red flex 10x3\" red  10x3\" red  10x3\" red   Fingerweb opening/closing 20x ea 3\" 20x ea 3\" 20x ea 3\" 3\"x20 ea 20x3\" ea  10x ea 3\" 10x ea 3\" 10x ea 3\" 10x ea 3\"   Elbow pro/sup AROM 3# 2x10 3# 2x10 3# 2x10 3# 2x10 3# 2x10  3# 2x10 3# 2x10 3# 2x10 3# 2x10   Wrist RD AROM 3# 2x10 3# 2x10 3# 2x10 3# 2x10 3# 2x10  2x10 2# 3# 2x10 3# 2x10 3# 2x10   Wrist ext AROM 3# 2x10 3# 2x10 3# 2x10 3# 2x10 3# 2x10  3# 2x10 3# 2x10 3# 2x10 3# 2x10   Wrist flex AROM 3# 2x10 3# 2x10 3# 2x10 3# 2x10 3# 2x10  3# 2x10 3# 2x10 3# 2x10 3# 2x10   Wrist flex/ext str       10x 10\" ea HEP     UBE for ROM 4' 4' 4' 4' 4'  4' 4' 4' 4'   Ther Activity             Lara's carry 9# kb 3 laps 9# kb 3 laps 9# kb 3 laps 9# kb  3 laps 9# kb 3 laps  7# 3 laps 9# kb  9# kb 3 laps 9# kb 3 laps   SA row [place] 12# 2x10 12# 2x10 2x10 12# 2x10 12# 2x10 12#  2x10 blk 12# 2x10 12# 2x10 12# 2x10   Gait Training                                       Modalities                                                        "

## 2024-10-14 ENCOUNTER — OFFICE VISIT (OUTPATIENT)
Dept: PHYSICAL THERAPY | Facility: CLINIC | Age: 49
End: 2024-10-14
Payer: OTHER MISCELLANEOUS

## 2024-10-14 DIAGNOSIS — Z98.890 S/P DECOMPRESSION OF ULNAR NERVE: Primary | ICD-10-CM

## 2024-10-14 DIAGNOSIS — G56.22 ENTRAPMENT OF LEFT ULNAR NERVE: ICD-10-CM

## 2024-10-14 PROCEDURE — 97530 THERAPEUTIC ACTIVITIES: CPT

## 2024-10-14 PROCEDURE — 97110 THERAPEUTIC EXERCISES: CPT

## 2024-10-14 PROCEDURE — 97112 NEUROMUSCULAR REEDUCATION: CPT

## 2024-10-14 NOTE — PROGRESS NOTES
"Daily Note     Today's date: 10/14/2024  Patient name: Mike Yoo  : 1975  MRN: [id number]  Referring provider: Lazarus, Mark David, MD  Dx:   Encounter Diagnosis     ICD-10-CM    1. S/P decompression of ulnar nerve  Z98.890       2. Entrapment of left ulnar nerve  G56.22           Start Time: 1015  Stop Time: 1100  Total time in clinic (min): 45 minutes    Subjective: Pt reports he saw the pain doctor who increased his medication      Objective: See treatment diary below      Assessment: Tolerated treatment well. Patient demonstrated fatigue post treatment, exhibited good technique with therapeutic exercises, and would benefit from continued PT. Pt is able to progress through program with reduction in stiffness noted at end of session. He continues to be challenged by program, especially farmer's carry, but is improving.       Plan: Progress treatment as tolerated.       Precautions: s/p L elbow LCL reconstruction, debridement, ulnar nerve decompression 24    POC expires Unit limit Auth Expiration date PT/OT + Visit Limit?    N/A  BOMN                 Visit/Unit Tracking  AUTH Status:  Date  9 - FOTO 9/18 9/23 9/25 9/30 10/2 10/7 10/9   BOMN Used 10/14               Remaining                  FOTO - done         Manuals 9/25 9/30 10/2 10/7 10/9 10/14  9/16 9/18 9/23   Elbow flex/ext/sup/pro PROM             STM for edema             Scar mobs                          Neuro Re-Ed             Pt education    4'          Ulnar NG              squeeze             Bicep curl 4# 4x5 4# 4x5 4# 4x5 4# 4x5 5# 4x5 5# 4x5  4# 4x5 4# 4x5 4# 4x5   Tricep ext 2x10 blk 2x10 blk 2x10 blk 2x10 Blk 2x10 blk 2x10 blk  2x10 blk 2x10 blk 2x10 blk   Wall shoulder taps 1x10 ea 1x10 ea 1x10 ea 10x ea 10x ea 10x ea  1x10 ea 1x10 ea 1x10 ea   WB wrist extension stretch 10\"x5 10\"x5 10\"x5 10\"x5  10\"x5  10\" x5 10\" x5 10\" x5   Ther Ex                          Flexbar  20x3\" red 20x3\" red 20x 3\" " "grn 3\"x20 Grn 20x3\" grn 20x3\" grn  10x3\" red  10x3\" red  10x3\" red   Fingerweb opening/closing 20x ea 3\" 20x ea 3\" 20x ea 3\" 3\"x20 ea 20x3\" ea 20x3\" ea  10x ea 3\" 10x ea 3\" 10x ea 3\"   Elbow pro/sup AROM 3# 2x10 3# 2x10 3# 2x10 3# 2x10 3# 2x10 3# 2x10  3# 2x10 3# 2x10 3# 2x10   Wrist RD AROM 3# 2x10 3# 2x10 3# 2x10 3# 2x10 3# 2x10 3# 2x10  3# 2x10 3# 2x10 3# 2x10   Wrist ext AROM 3# 2x10 3# 2x10 3# 2x10 3# 2x10 3# 2x10 3# 2x10  3# 2x10 3# 2x10 3# 2x10   Wrist flex AROM 3# 2x10 3# 2x10 3# 2x10 3# 2x10 3# 2x10 3# 2x10  3# 2x10 3# 2x10 3# 2x10   Wrist flex/ext str        HEP     UBE for ROM 4' 4' 4' 4' 4' 4'  4' 4' 4'   Ther Activity             Lara's carry 9# kb 3 laps 9# kb 3 laps 9# kb 3 laps 9# kb  3 laps 9# kb 3 laps 9# kb 3 laps  9# kb  9# kb 3 laps 9# kb 3 laps   SA row [place] 12# 2x10 12# 2x10 2x10 12# 2x10 12# 2x10 12# 2x10 12#  12# 2x10 12# 2x10 12# 2x10   Gait Training                                       Modalities                                                          "

## 2024-10-16 ENCOUNTER — OFFICE VISIT (OUTPATIENT)
Dept: PHYSICAL THERAPY | Facility: CLINIC | Age: 49
End: 2024-10-16
Payer: OTHER MISCELLANEOUS

## 2024-10-16 DIAGNOSIS — G56.22 ENTRAPMENT OF LEFT ULNAR NERVE: ICD-10-CM

## 2024-10-16 DIAGNOSIS — Z98.890 S/P DECOMPRESSION OF ULNAR NERVE: Primary | ICD-10-CM

## 2024-10-16 PROCEDURE — 97110 THERAPEUTIC EXERCISES: CPT

## 2024-10-16 PROCEDURE — 97112 NEUROMUSCULAR REEDUCATION: CPT

## 2024-10-16 PROCEDURE — 97530 THERAPEUTIC ACTIVITIES: CPT

## 2024-10-16 NOTE — PROGRESS NOTES
"Daily Note     Today's date: 10/16/2024  Patient name: Mike Yoo  : 1975  MRN: [id number]  Referring provider: Lazarus, Mark David, MD  Dx:   Encounter Diagnosis     ICD-10-CM    1. S/P decompression of ulnar nerve  Z98.890       2. Entrapment of left ulnar nerve  G56.22           Start Time: 0930  Stop Time: 1015  Total time in clinic (min): 45 minutes    Subjective: Pt reports yesterday and today his hand/wrist is very stiff.       Objective: See treatment diary below      Assessment: Tolerated treatment well. Pt demonstrates good effort throughout session. Minimal cueing given to facilitate proper exercise performance and technique. He performs all exercises as outlined below. Increased weight by 1# for SA rows. Patient demonstrated fatigue post treatment, exhibited good technique with therapeutic exercises, and would benefit from continued PT      Plan: Continue per plan of care.      Precautions: s/p L elbow LCL reconstruction, debridement, ulnar nerve decompression 24    POC expires Unit limit Auth Expiration date PT/OT + Visit Limit?    N/A  BOMN                 Visit/Unit Tracking  AUTH Status:  Date 10/14 10/16   9/16 - FOTO 9/18 9/23 9/25 9/30 10/2 10/7 10/9   BOMN Used                Remaining                  FOTO - done         Manuals 9/25 9/30 10/2 10/7 10/9 10/14 10/16      Elbow flex/ext/sup/pro PROM             STM for edema             Scar mobs                          Neuro Re-Ed             Pt education    4'          Ulnar NG              squeeze             Bicep curl 4# 4x5 4# 4x5 4# 4x5 4# 4x5 5# 4x5 5# 4x5 5# 4x5      Tricep ext 2x10 blk 2x10 blk 2x10 blk 2x10 Blk 2x10 blk 2x10 blk 2x10 blk      Wall shoulder taps 1x10 ea 1x10 ea 1x10 ea 10x ea 10x ea 10x ea 10x ea      WB wrist extension stretch 10\"x5 10\"x5 10\"x5 10\"x5  10\"x5 10\"x5      Ther Ex                          Flexbar  20x3\" red 20x3\" red 20x 3\" grn 3\"x20 Grn 20x3\" grn 20x3\" grn 20x3\" grn    " "  Fingerweb opening/closing 20x ea 3\" 20x ea 3\" 20x ea 3\" 3\"x20 ea 20x3\" ea 20x3\" ea 20x3\" ea      Elbow pro/sup AROM 3# 2x10 3# 2x10 3# 2x10 3# 2x10 3# 2x10 3# 2x10 3# 2x10      Wrist RD AROM 3# 2x10 3# 2x10 3# 2x10 3# 2x10 3# 2x10 3# 2x10 3# 2x10      Wrist ext AROM 3# 2x10 3# 2x10 3# 2x10 3# 2x10 3# 2x10 3# 2x10 3# 2x10      Wrist flex AROM 3# 2x10 3# 2x10 3# 2x10 3# 2x10 3# 2x10 3# 2x10 3# 2x10      Wrist flex/ext str             UBE for ROM 4' 4' 4' 4' 4' 4' 4'      Ther Activity             Lara's carry 9# kb 3 laps 9# kb 3 laps 9# kb 3 laps 9# kb  3 laps 9# kb 3 laps 9# kb 3 laps 9# kb 3 laps      SA row [place] 12# 2x10 12# 2x10 2x10 12# 2x10 12# 2x10 12# 2x10 12# 2x10 13#      Gait Training                                       Modalities                                                            "

## 2024-10-21 ENCOUNTER — OFFICE VISIT (OUTPATIENT)
Dept: PHYSICAL THERAPY | Facility: CLINIC | Age: 49
End: 2024-10-21
Payer: OTHER MISCELLANEOUS

## 2024-10-21 DIAGNOSIS — Z98.890 S/P DECOMPRESSION OF ULNAR NERVE: Primary | ICD-10-CM

## 2024-10-21 DIAGNOSIS — G56.22 ENTRAPMENT OF LEFT ULNAR NERVE: ICD-10-CM

## 2024-10-21 PROCEDURE — 97530 THERAPEUTIC ACTIVITIES: CPT

## 2024-10-21 PROCEDURE — 97110 THERAPEUTIC EXERCISES: CPT

## 2024-10-21 PROCEDURE — 97112 NEUROMUSCULAR REEDUCATION: CPT

## 2024-10-21 NOTE — PROGRESS NOTES
"Daily Note     Today's date: 10/21/2024  Patient name: Mike Yoo  : 1975  MRN: [id number]  Referring provider: Lazarus, Mark David, MD  Dx:   Encounter Diagnosis     ICD-10-CM    1. S/P decompression of ulnar nerve  Z98.890       2. Entrapment of left ulnar nerve  G56.22           Start Time: 1015  Stop Time: 1100  Total time in clinic (min): 45 minutes    Subjective: Pt reports his elbow feels better today but thinks the warmer weather helps.       Objective: See treatment diary below      Assessment: Tolerated treatment well. Patient demonstrated fatigue post treatment, exhibited good technique with therapeutic exercises, and would benefit from continued PT. Increased weight for triceps ext this session to continue improving elbow strength. He was challenged by program but denies any new or increased symptoms during or following session.       Plan: Progress treatment as tolerated.       Precautions: s/p L elbow LCL reconstruction, debridement, ulnar nerve decompression 24    POC expires Unit limit Auth Expiration date PT/OT + Visit Limit?    N/A  BOMN                 Visit/Unit Tracking  AUTH Status:  Date 10/14 10/16 10/21  9/16 - FOTO 9/18 9/23 9/25 9/30 10/2 10/7 10/9   BOMN Used                Remaining                  FOTO - done         Manuals 9/25 9/30 10/2 10/7 10/9 10/14 10/16 10/21     Elbow flex/ext/sup/pro PROM             STM for edema             Scar mobs                          Neuro Re-Ed             Pt education    4'          Ulnar NG              squeeze             Bicep curl 4# 4x5 4# 4x5 4# 4x5 4# 4x5 5# 4x5 5# 4x5 5# 4x5 5# 4x5     Tricep ext 2x10 blk 2x10 blk 2x10 blk 2x10 Blk 2x10 blk 2x10 blk 2x10 blk [place] 12# 2x10     Wall shoulder taps 1x10 ea 1x10 ea 1x10 ea 10x ea 10x ea 10x ea 10x ea 10x ea     WB wrist extension stretch 10\"x5 10\"x5 10\"x5 10\"x5  10\"x5 10\"x5      Ther Ex                          Flexbar  20x3\" red 20x3\" red 20x 3\" grn 3\"x20 Grn " "20x3\" grn 20x3\" grn 20x3\" grn 20x3\" grn     Fingerweb opening/closing 20x ea 3\" 20x ea 3\" 20x ea 3\" 3\"x20 ea 20x3\" ea 20x3\" ea 20x3\" ea 20x3\" ea     Elbow pro/sup AROM 3# 2x10 3# 2x10 3# 2x10 3# 2x10 3# 2x10 3# 2x10 3# 2x10 3# 2x10     Wrist RD AROM 3# 2x10 3# 2x10 3# 2x10 3# 2x10 3# 2x10 3# 2x10 3# 2x10 3# 2x10     Wrist ext AROM 3# 2x10 3# 2x10 3# 2x10 3# 2x10 3# 2x10 3# 2x10 3# 2x10 3# 2x10     Wrist flex AROM 3# 2x10 3# 2x10 3# 2x10 3# 2x10 3# 2x10 3# 2x10 3# 2x10 3# 2x10     Wrist flex/ext str             UBE for ROM 4' 4' 4' 4' 4' 4' 4' 4'     Ther Activity             Lara's carry 9# kb 3 laps 9# kb 3 laps 9# kb 3 laps 9# kb  3 laps 9# kb 3 laps 9# kb 3 laps 9# kb 3 laps 9# kb 3 laps     SA flavio [place] 12# 2x10 12# 2x10 2x10 12# 2x10 12# 2x10 12# 2x10 12# 2x10 13# 2x10 13#     Gait Training                                       Modalities                                                              "

## 2024-10-23 ENCOUNTER — OFFICE VISIT (OUTPATIENT)
Dept: PHYSICAL THERAPY | Facility: CLINIC | Age: 49
End: 2024-10-23
Payer: OTHER MISCELLANEOUS

## 2024-10-23 DIAGNOSIS — G56.22 ENTRAPMENT OF LEFT ULNAR NERVE: ICD-10-CM

## 2024-10-23 DIAGNOSIS — Z98.890 S/P DECOMPRESSION OF ULNAR NERVE: Primary | ICD-10-CM

## 2024-10-23 PROCEDURE — 97530 THERAPEUTIC ACTIVITIES: CPT

## 2024-10-23 PROCEDURE — 97110 THERAPEUTIC EXERCISES: CPT

## 2024-10-23 PROCEDURE — 97112 NEUROMUSCULAR REEDUCATION: CPT

## 2024-10-23 NOTE — PROGRESS NOTES
"Daily Note     Today's date: 10/23/2024  Patient name: Mike Yoo  : 1975  MRN: [id number]  Referring provider: Lazarus, Mark David, MD  Dx:   Encounter Diagnosis     ICD-10-CM    1. S/P decompression of ulnar nerve  Z98.890       2. Entrapment of left ulnar nerve  G56.22           Start Time: 1015  Stop Time: 1100  Total time in clinic (min): 45 minutes    Subjective: Pt reports the pain and pressure in his elbow continues with medial numbness. Wrist pain comes and goes.       Objective: See treatment diary below      Assessment: Tolerated treatment well. Pt demonstrates good effort throughout session. Minimal cueing given to facilitate proper exercise performance and technique. He tolerates recent progressions well with some discomfort as fatigue increases. Patient demonstrated fatigue post treatment, exhibited good technique with therapeutic exercises, and would benefit from continued PT      Plan: Continue per plan of care.      Precautions: s/p L elbow LCL reconstruction, debridement, ulnar nerve decompression 24    POC expires Unit limit Auth Expiration date PT/OT + Visit Limit?    N/A  BOMN                 Visit/Unit Tracking  AUTH Status:  Date 10/14 10/16 10/21 10/23     9/30 10/2 10/7 10/9   BOMN Used                Remaining                  FOTO - done         Manuals 9/25 9/30 10/2 10/7 10/9 10/14 10/16 10/21 10/23    Elbow flex/ext/sup/pro PROM             STM for edema             Scar mobs                          Neuro Re-Ed             Pt education    4'          Ulnar NG              squeeze             Bicep curl 4# 4x5 4# 4x5 4# 4x5 4# 4x5 5# 4x5 5# 4x5 5# 4x5 5# 4x5 5# 4x5    Tricep ext 2x10 blk 2x10 blk 2x10 blk 2x10 Blk 2x10 blk 2x10 blk 2x10 blk [place] 12# 2x10 [place] 12# 2x10    Wall shoulder taps 1x10 ea 1x10 ea 1x10 ea 10x ea 10x ea 10x ea 10x ea 10x ea 10x ea    WB wrist extension stretch 10\"x5 10\"x5 10\"x5 10\"x5  10\"x5 10\"x5  10\"x5    Ther Ex                " "          Flexbar  20x3\" red 20x3\" red 20x 3\" grn 3\"x20 Grn 20x3\" grn 20x3\" grn 20x3\" grn 20x3\" grn 20x3\" grn    Fingerweb opening/closing 20x ea 3\" 20x ea 3\" 20x ea 3\" 3\"x20 ea 20x3\" ea 20x3\" ea 20x3\" ea 20x3\" ea 20x3\" ea    Elbow pro/sup AROM 3# 2x10 3# 2x10 3# 2x10 3# 2x10 3# 2x10 3# 2x10 3# 2x10 3# 2x10 3# 2x10    Wrist RD AROM 3# 2x10 3# 2x10 3# 2x10 3# 2x10 3# 2x10 3# 2x10 3# 2x10 3# 2x10 3# 2x10    Wrist ext AROM 3# 2x10 3# 2x10 3# 2x10 3# 2x10 3# 2x10 3# 2x10 3# 2x10 3# 2x10 3# 2x10    Wrist flex AROM 3# 2x10 3# 2x10 3# 2x10 3# 2x10 3# 2x10 3# 2x10 3# 2x10 3# 2x10 3# 2x10    Wrist flex/ext str             UBE for ROM 4' 4' 4' 4' 4' 4' 4' 4' 4'    Ther Activity             Lara's carry 9# kb 3 laps 9# kb 3 laps 9# kb 3 laps 9# kb  3 laps 9# kb 3 laps 9# kb 3 laps 9# kb 3 laps 9# kb 3 laps 9# kb 3 laps    SA row Lisa 12# 2x10 12# 2x10 2x10 12# 2x10 12# 2x10 12# 2x10 12# 2x10 13# 2x10 13# 2x10 13#    Gait Training                                       Modalities                                                                "

## 2024-10-28 ENCOUNTER — OFFICE VISIT (OUTPATIENT)
Dept: PHYSICAL THERAPY | Facility: CLINIC | Age: 49
End: 2024-10-28
Payer: OTHER MISCELLANEOUS

## 2024-10-28 DIAGNOSIS — Z98.890 S/P DECOMPRESSION OF ULNAR NERVE: Primary | ICD-10-CM

## 2024-10-28 DIAGNOSIS — G56.22 ENTRAPMENT OF LEFT ULNAR NERVE: ICD-10-CM

## 2024-10-28 PROCEDURE — 97112 NEUROMUSCULAR REEDUCATION: CPT | Performed by: PHYSICAL MEDICINE & REHABILITATION

## 2024-10-28 PROCEDURE — 97110 THERAPEUTIC EXERCISES: CPT | Performed by: PHYSICAL MEDICINE & REHABILITATION

## 2024-10-28 PROCEDURE — 97530 THERAPEUTIC ACTIVITIES: CPT | Performed by: PHYSICAL MEDICINE & REHABILITATION

## 2024-10-28 NOTE — PROGRESS NOTES
"Daily Note     Today's date: 10/28/2024  Patient name: Mike Yoo  : 1975  MRN: [id number]  Referring provider: Lazarus, Mark David, MD  Dx:   Encounter Diagnosis     ICD-10-CM    1. S/P decompression of ulnar nerve  Z98.890       2. Entrapment of left ulnar nerve  G56.22                      Subjective: Patient reports stiffness in L elbow to begin session.     Objective: See treatment diary below    Assessment: Tolerated treatment well. Continues to be challenged with current program. Patient demonstrated fatigue post treatment, exhibited good technique with therapeutic exercises, and would benefit from continued PT. Continue as able nv.     Plan: Continue per plan of care.      Precautions: s/p L elbow LCL reconstruction, debridement, ulnar nerve decompression 24    POC expires Unit limit Auth Expiration date PT/OT + Visit Limit?    N/A  BOMN                 Visit/Unit Tracking  AUTH Status:  Date 10/14 10/16 10/21 10/23 10/28    9/30 10/2 10/7 10/9   BOMN Used                Remaining                  FOTO - done       Manuals 9/25 9/30 10/2 10/7 10/9 10/14 10/16 10/21 10/23 10/28   Elbow flex/ext/sup/pro PROM             STM for edema             Scar mobs                          Neuro Re-Ed          10/28   Pt education    4'          Ulnar NG              squeeze             Bicep curl 4# 4x5 4# 4x5 4# 4x5 4# 4x5 5# 4x5 5# 4x5 5# 4x5 5# 4x5 5# 4x5 5# 4x5   Tricep ext 2x10 blk 2x10 blk 2x10 blk 2x10 Blk 2x10 blk 2x10 blk 2x10 blk [place] 12# 2x10 Lisa 12# 2x10 [place] 12#   Wall shoulder taps 1x10 ea 1x10 ea 1x10 ea 10x ea 10x ea 10x ea 10x ea 10x ea 10x ea 10x ea   WB wrist extension stretch 10\"x5 10\"x5 10\"x5 10\"x5  10\"x5 10\"x5  10\"x5 5x10\"   Ther Ex          10/28                Flexbar  20x3\" red 20x3\" red 20x 3\" grn 3\"x20 Grn 20x3\" grn 20x3\" grn 20x3\" grn 20x3\" grn 20x3\" grn [place] 20x3\"   Fingerweb opening/closing 20x ea 3\" 20x ea 3\" 20x ea 3\" 3\"x20 ea 20x3\" ea 20x3\" ea " "20x3\" ea 20x3\" ea 20x3\" ea 20x3\"   Elbow pro/sup AROM 3# 2x10 3# 2x10 3# 2x10 3# 2x10 3# 2x10 3# 2x10 3# 2x10 3# 2x10 3# 2x10 3# 2x10   Wrist RD AROM 3# 2x10 3# 2x10 3# 2x10 3# 2x10 3# 2x10 3# 2x10 3# 2x10 3# 2x10 3# 2x10 3# 2x10   Wrist ext AROM 3# 2x10 3# 2x10 3# 2x10 3# 2x10 3# 2x10 3# 2x10 3# 2x10 3# 2x10 3# 2x10 3# 2x10   Wrist flex AROM 3# 2x10 3# 2x10 3# 2x10 3# 2x10 3# 2x10 3# 2x10 3# 2x10 3# 2x10 3# 2x10 3# 2x10   Wrist flex/ext str             UBE for ROM 4' 4' 4' 4' 4' 4' 4' 4' 4' 4'   Ther Activity          10/28   Farmer's carry 9# kb 3 laps 9# kb 3 laps 9# kb 3 laps 9# kb  3 laps 9# kb 3 laps 9# kb 3 laps 9# kb 3 laps 9# kb 3 laps 9# kb 3 laps 9# 3 laps   SA flavio [place] 12# 2x10 12# 2x10 2x10 12# 2x10 12# 2x10 12# 2x10 12# 2x10 13# 2x10 13# 2x10 13# [place] 2x10 13#   Gait Training                                       Modalities                                                                "

## 2024-10-30 ENCOUNTER — APPOINTMENT (OUTPATIENT)
Dept: PHYSICAL THERAPY | Facility: CLINIC | Age: 49
End: 2024-10-30
Payer: OTHER MISCELLANEOUS

## (undated) DEVICE — CHLORAPREP HI-LITE 26ML ORANGE

## (undated) DEVICE — REM POLYHESIVE ADULT PATIENT RETURN ELECTRODE: Brand: VALLEYLAB

## (undated) DEVICE — SORBAFIX ABSORBABLE FIXATION SYSTEM 30 ABSORBABLE FASTENERS: Brand: SORBAFIX ABSORBABLE FIXATION SYSTEM

## (undated) DEVICE — INTENDED FOR TISSUE SEPARATION, AND OTHER PROCEDURES THAT REQUIRE A SHARP SURGICAL BLADE TO PUNCTURE OR CUT.: Brand: BARD-PARKER SAFETY BLADES SIZE 15, STERILE

## (undated) DEVICE — 3M™ TEGADERM™ TRANSPARENT FILM DRESSING FRAME STYLE, 1624W, 2-3/8 IN X 2-3/4 IN (6 CM X 7 CM), 100/CT 4CT/CASE: Brand: 3M™ TEGADERM™

## (undated) DEVICE — ENDOPATH XCEL UNIVERSAL TROCAR STABLILITY SLEEVES: Brand: ENDOPATH XCEL

## (undated) DEVICE — GLOVE SRG BIOGEL ECLIPSE 7.5

## (undated) DEVICE — CLOSURE DEVICE ENDO CLOSE

## (undated) DEVICE — SUT NUROLON 0 CTX C551D

## (undated) DEVICE — [HIGH FLOW INSUFFLATOR,  DO NOT USE IF PACKAGE IS DAMAGED,  KEEP DRY,  KEEP AWAY FROM SUNLIGHT,  PROTECT FROM HEAT AND RADIOACTIVE SOURCES.]: Brand: PNEUMOSURE

## (undated) DEVICE — ENDOPATH PNEUMONEEDLE INSUFFLATION NEEDLES WITH LUER LOCK CONNECTORS 120MM: Brand: ENDOPATH

## (undated) DEVICE — ENDOPATH XCEL BLADELESS TROCARS WITH STABILITY SLEEVES: Brand: ENDOPATH XCEL

## (undated) DEVICE — ALLENTOWN LAP CHOLE APP PACK: Brand: CARDINAL HEALTH

## (undated) DEVICE — GLOVE INDICATOR PI UNDERGLOVE SZ 7.5 BLUE

## (undated) DEVICE — 3M™ STERI-STRIP™ REINFORCED ADHESIVE SKIN CLOSURES, R1546, 1/4 IN X 4 IN (6 MM X 100 MM), 10 STRIPS/ENVELOPE: Brand: 3M™ STERI-STRIP™

## (undated) DEVICE — SUT VICRYL 4-0 PS-2 27 IN J426H

## (undated) DEVICE — SCD SEQUENTIAL COMPRESSION COMFORT SLEEVE MEDIUM KNEE LENGTH: Brand: KENDALL SCD

## (undated) DEVICE — HYDROPHILIC WOUND DRESSING WITH ZINC PLUS VITAMINS A AND B6.: Brand: DERMAGRAN®-B

## (undated) DEVICE — ELECTRODE LAP L WIRE E-Z CLEAN 33CM -0100

## (undated) DEVICE — GAUZE SPONGES,16 PLY: Brand: CURITY

## (undated) DEVICE — DRAPE EQUIPMENT RF WAND

## (undated) DEVICE — LIGHT HANDLE COVER SLEEVE DISP BLUE STELLAR